# Patient Record
Sex: FEMALE | Race: WHITE | NOT HISPANIC OR LATINO | Employment: OTHER | URBAN - METROPOLITAN AREA
[De-identification: names, ages, dates, MRNs, and addresses within clinical notes are randomized per-mention and may not be internally consistent; named-entity substitution may affect disease eponyms.]

---

## 2019-05-07 ENCOUNTER — HOSPITAL ENCOUNTER (OUTPATIENT)
Dept: FAMILY MEDICINE CLINIC | Facility: CLINIC | Age: 24
Setting detail: SPECIMEN
Discharge: HOME | End: 2019-05-07
Attending: FAMILY MEDICINE | Admitting: FAMILY MEDICINE

## 2019-05-08 LAB
C TRACH RRNA SPEC QL PROBE: NORMAL
N GONORRHOEA RRNA SPEC QL PROBE: NORMAL
SPECIMEN SOURCE: NORMAL

## 2019-05-14 LAB — THIN PREP CVX: NORMAL

## 2019-07-24 ENCOUNTER — OFFICE VISIT (OUTPATIENT)
Dept: FAMILY MEDICINE CLINIC | Facility: CLINIC | Age: 24
End: 2019-07-24

## 2019-07-24 VITALS
WEIGHT: 180 LBS | OXYGEN SATURATION: 98 % | DIASTOLIC BLOOD PRESSURE: 87 MMHG | BODY MASS INDEX: 28.25 KG/M2 | SYSTOLIC BLOOD PRESSURE: 142 MMHG | HEIGHT: 67 IN | HEART RATE: 86 BPM

## 2019-07-24 DIAGNOSIS — R10.84 GENERALIZED ABDOMINAL PAIN: Primary | ICD-10-CM

## 2019-07-24 DIAGNOSIS — L50.9 URTICARIA: ICD-10-CM

## 2019-07-24 PROCEDURE — 99214 OFFICE O/P EST MOD 30 MIN: CPT | Performed by: FAMILY MEDICINE

## 2019-07-24 RX ORDER — IBUPROFEN 800 MG/1
TABLET ORAL EVERY 8 HOURS
COMMUNITY
Start: 2017-10-09 | End: 2022-03-14

## 2019-07-24 RX ORDER — SUMATRIPTAN 5 MG/1
SPRAY NASAL
COMMUNITY
Start: 2017-10-09 | End: 2019-11-08 | Stop reason: SDUPTHER

## 2019-07-24 RX ORDER — NORGESTIMATE AND ETHINYL ESTRADIOL 7DAYSX3 LO
KIT ORAL
COMMUNITY
Start: 2019-05-07 | End: 2019-08-21 | Stop reason: SDUPTHER

## 2019-07-24 RX ORDER — OMEPRAZOLE 20 MG/1
CAPSULE, DELAYED RELEASE ORAL
COMMUNITY
Start: 2018-06-19 | End: 2019-12-17

## 2019-07-24 NOTE — PROGRESS NOTES
"Subjective   Maribel Zabala is a 23 y.o. female.     Chief Complaint   Patient presents with   • Allergies       HPI     Having some allergies to food-fish/gluten, would like testing    Stomach pain, bloating w/ gluten, off gluten x few weeks    appt in aug w/ gi    Noticed hive on leg and back    Nausea vomitting w/ fish    Review of Systems      No past medical history on file.  No past surgical history on file.  No family history on file.  Social History     Tobacco Use   • Smoking status: Never Smoker   • Smokeless tobacco: Never Used   Substance Use Topics   • Alcohol use: No     Frequency: Never       No Known Allergies        Current Outpatient Medications:   •  ibuprofen (ADVIL,MOTRIN) 800 MG tablet, Every 8 (Eight) Hours., Disp: , Rfl:   •  omeprazole (priLOSEC) 20 MG capsule, OMEPRAZOLE 20 MG CPDR, Disp: , Rfl:   •  Polyethylene Glycol 3350 (MIRALAX PO), MIRALAX POWD, Disp: , Rfl:   •  SUMAtriptan (IMITREX) 5 MG/ACT nasal spray, IMITREX 5 MG/ACT SOLN, Disp: , Rfl:   •  TRI-LO-ESTARYLLA 0.18/0.215/0.25 MG-25 MCG per tablet, , Disp: , Rfl:           Visit Vitals  /87 (BP Location: Left arm, Cuff Size: Adult)   Pulse 86   Ht 168.9 cm (66.5\")   Wt 81.6 kg (180 lb)   SpO2 98%   BMI 28.62 kg/m²       Objective       Physical Exam   Constitutional: She appears well-developed and well-nourished.   HENT:   Head: Normocephalic and atraumatic.   Psychiatric: She has a normal mood and affect. Her behavior is normal. Judgment and thought content normal.         Diagnoses and all orders for this visit:    1. Generalized abdominal pain (Primary)    2. Urticaria    20 of 25min spent discussing pt medical concerns and treatment    Long discussion w/ pt concerning symptoms and posible causes, d/w pt that she would have to be eating gluten for few weeks before most testing, advise she d/w gi  "

## 2019-08-22 RX ORDER — NORGESTIMATE AND ETHINYL ESTRADIOL 7DAYSX3 LO
1 KIT ORAL DAILY
Qty: 28 TABLET | Refills: 11 | Status: SHIPPED | OUTPATIENT
Start: 2019-08-22 | End: 2019-11-08 | Stop reason: SDUPTHER

## 2019-11-08 RX ORDER — SUMATRIPTAN 5 MG/1
1 SPRAY NASAL
Qty: 1 EACH | Refills: 1 | Status: SHIPPED | OUTPATIENT
Start: 2019-11-08 | End: 2023-02-24 | Stop reason: SDUPTHER

## 2019-11-08 RX ORDER — NORGESTIMATE AND ETHINYL ESTRADIOL 7DAYSX3 LO
1 KIT ORAL DAILY
Qty: 28 TABLET | Refills: 1 | Status: SHIPPED | OUTPATIENT
Start: 2019-11-08 | End: 2019-11-08 | Stop reason: SDUPTHER

## 2019-11-10 RX ORDER — NORGESTIMATE AND ETHINYL ESTRADIOL 7DAYSX3 LO
1 KIT ORAL DAILY
Qty: 84 TABLET | Refills: 0 | Status: SHIPPED | OUTPATIENT
Start: 2019-11-10 | End: 2019-12-23 | Stop reason: SDUPTHER

## 2019-12-17 PROCEDURE — 87077 CULTURE AEROBIC IDENTIFY: CPT | Performed by: NURSE PRACTITIONER

## 2019-12-17 PROCEDURE — 87186 SC STD MICRODIL/AGAR DIL: CPT | Performed by: NURSE PRACTITIONER

## 2019-12-17 PROCEDURE — 87086 URINE CULTURE/COLONY COUNT: CPT | Performed by: NURSE PRACTITIONER

## 2019-12-23 RX ORDER — NORGESTIMATE AND ETHINYL ESTRADIOL 7DAYSX3 LO
1 KIT ORAL DAILY
Qty: 84 TABLET | Refills: 0 | Status: SHIPPED | OUTPATIENT
Start: 2019-12-23 | End: 2020-03-04 | Stop reason: ALTCHOICE

## 2020-03-04 ENCOUNTER — HOSPITAL ENCOUNTER (OUTPATIENT)
Dept: GENERAL RADIOLOGY | Facility: HOSPITAL | Age: 25
Discharge: HOME | End: 2020-03-04
Admitting: FAMILY MEDICINE

## 2020-03-04 ENCOUNTER — LAB (OUTPATIENT)
Dept: LAB | Facility: HOSPITAL | Age: 25
End: 2020-03-04

## 2020-03-04 ENCOUNTER — OFFICE VISIT (OUTPATIENT)
Dept: FAMILY MEDICINE CLINIC | Facility: CLINIC | Age: 25
End: 2020-03-04

## 2020-03-04 VITALS
DIASTOLIC BLOOD PRESSURE: 72 MMHG | HEART RATE: 84 BPM | OXYGEN SATURATION: 100 % | SYSTOLIC BLOOD PRESSURE: 105 MMHG | BODY MASS INDEX: 26.84 KG/M2 | WEIGHT: 167 LBS | HEIGHT: 66 IN

## 2020-03-04 DIAGNOSIS — R06.00 DYSPNEA, UNSPECIFIED TYPE: ICD-10-CM

## 2020-03-04 DIAGNOSIS — R00.2 PALPITATIONS: ICD-10-CM

## 2020-03-04 DIAGNOSIS — Z00.01 ENCOUNTER FOR PREVENTATIVE ADULT HEALTH CARE EXAM WITH ABNORMAL FINDINGS: ICD-10-CM

## 2020-03-04 DIAGNOSIS — Z23 NEED FOR VACCINATION: ICD-10-CM

## 2020-03-04 DIAGNOSIS — R07.9 CHEST PAIN, UNSPECIFIED TYPE: ICD-10-CM

## 2020-03-04 DIAGNOSIS — Z00.01 ENCOUNTER FOR PREVENTATIVE ADULT HEALTH CARE EXAM WITH ABNORMAL FINDINGS: Primary | ICD-10-CM

## 2020-03-04 PROBLEM — Z01.419 ENCOUNTER FOR ROUTINE GYNECOLOGICAL EXAMINATION: Status: ACTIVE | Noted: 2019-05-07

## 2020-03-04 PROBLEM — G43.909 MIGRAINE HEADACHE: Status: ACTIVE | Noted: 2017-10-09

## 2020-03-04 LAB
B-HCG UR QL: NEGATIVE
BASOPHILS # BLD AUTO: 0.06 10*3/MM3 (ref 0–0.2)
BASOPHILS NFR BLD AUTO: 1 % (ref 0–1.5)
BILIRUB BLD-MCNC: NEGATIVE MG/DL
CLARITY, POC: CLEAR
COLOR UR: YELLOW
DEPRECATED RDW RBC AUTO: 42.3 FL (ref 37–54)
EOSINOPHIL # BLD AUTO: 0.23 10*3/MM3 (ref 0–0.4)
EOSINOPHIL NFR BLD AUTO: 3.7 % (ref 0.3–6.2)
ERYTHROCYTE [DISTWIDTH] IN BLOOD BY AUTOMATED COUNT: 13.1 % (ref 12.3–15.4)
GLUCOSE UR STRIP-MCNC: NEGATIVE MG/DL
HCT VFR BLD AUTO: 39.5 % (ref 34–46.6)
HGB BLD-MCNC: 13 G/DL (ref 12–15.9)
IMM GRANULOCYTES # BLD AUTO: 0.02 10*3/MM3 (ref 0–0.05)
IMM GRANULOCYTES NFR BLD AUTO: 0.3 % (ref 0–0.5)
INTERNAL NEGATIVE CONTROL: NEGATIVE
INTERNAL POSITIVE CONTROL: POSITIVE
KETONES UR QL: NEGATIVE
LEUKOCYTE EST, POC: NEGATIVE
LYMPHOCYTES # BLD AUTO: 2.5 10*3/MM3 (ref 0.7–3.1)
LYMPHOCYTES NFR BLD AUTO: 39.8 % (ref 19.6–45.3)
Lab: NORMAL
MCH RBC QN AUTO: 29.3 PG (ref 26.6–33)
MCHC RBC AUTO-ENTMCNC: 32.9 G/DL (ref 31.5–35.7)
MCV RBC AUTO: 89 FL (ref 79–97)
MONOCYTES # BLD AUTO: 0.48 10*3/MM3 (ref 0.1–0.9)
MONOCYTES NFR BLD AUTO: 7.6 % (ref 5–12)
NEUTROPHILS # BLD AUTO: 2.99 10*3/MM3 (ref 1.7–7)
NEUTROPHILS NFR BLD AUTO: 47.6 % (ref 42.7–76)
NITRITE UR-MCNC: NEGATIVE MG/ML
NRBC BLD AUTO-RTO: 0 /100 WBC (ref 0–0.2)
PH UR: 7 [PH] (ref 5–8)
PLATELET # BLD AUTO: 207 10*3/MM3 (ref 140–450)
PMV BLD AUTO: 11.8 FL (ref 6–12)
PROT UR STRIP-MCNC: NEGATIVE MG/DL
RBC # BLD AUTO: 4.44 10*6/MM3 (ref 3.77–5.28)
RBC # UR STRIP: ABNORMAL /UL
SP GR UR: 1.01 (ref 1–1.03)
UROBILINOGEN UR QL: NORMAL
WBC NRBC COR # BLD: 6.28 10*3/MM3 (ref 3.4–10.8)

## 2020-03-04 PROCEDURE — 80053 COMPREHEN METABOLIC PANEL: CPT | Performed by: FAMILY MEDICINE

## 2020-03-04 PROCEDURE — 36415 COLL VENOUS BLD VENIPUNCTURE: CPT | Performed by: FAMILY MEDICINE

## 2020-03-04 PROCEDURE — 85025 COMPLETE CBC W/AUTO DIFF WBC: CPT

## 2020-03-04 PROCEDURE — 99395 PREV VISIT EST AGE 18-39: CPT | Performed by: FAMILY MEDICINE

## 2020-03-04 PROCEDURE — 84443 ASSAY THYROID STIM HORMONE: CPT | Performed by: FAMILY MEDICINE

## 2020-03-04 PROCEDURE — 71046 X-RAY EXAM CHEST 2 VIEWS: CPT

## 2020-03-04 PROCEDURE — 93000 ELECTROCARDIOGRAM COMPLETE: CPT | Performed by: FAMILY MEDICINE

## 2020-03-04 PROCEDURE — 90649 4VHPV VACCINE 3 DOSE IM: CPT | Performed by: FAMILY MEDICINE

## 2020-03-04 PROCEDURE — 90471 IMMUNIZATION ADMIN: CPT | Performed by: FAMILY MEDICINE

## 2020-03-04 PROCEDURE — 81003 URINALYSIS AUTO W/O SCOPE: CPT | Performed by: FAMILY MEDICINE

## 2020-03-04 PROCEDURE — 81025 URINE PREGNANCY TEST: CPT | Performed by: FAMILY MEDICINE

## 2020-03-04 RX ORDER — NORGESTIMATE AND ETHINYL ESTRADIOL 0.25-0.035
1 KIT ORAL DAILY
Qty: 28 TABLET | Refills: 0 | Status: SHIPPED | OUTPATIENT
Start: 2020-03-04 | End: 2020-03-04 | Stop reason: SDUPTHER

## 2020-03-04 RX ORDER — NORGESTIMATE AND ETHINYL ESTRADIOL 0.25-0.035
1 KIT ORAL DAILY
Qty: 84 TABLET | Refills: 3 | Status: SHIPPED | OUTPATIENT
Start: 2020-03-04 | End: 2020-08-05 | Stop reason: SDUPTHER

## 2020-03-04 NOTE — PROGRESS NOTES
"Subjective   Maribel Zabala is a 24 y.o. female.     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  Past Medical History:   Diagnosis Date   • Abdominal pain    • Broken foot     left-4/18   • Constipation     NOS   • Contraceptive surveillance     NEC   • Foot pain     left   • Migraine headache    • Oral cyst     cyst under tongue   • URI, acute      Past Surgical History:   Procedure Laterality Date   • CHOLECYSTECTOMY  2011   • CYST REMOVAL      oral   • ENDOSCOPY       Family History   Problem Relation Age of Onset   • Hypertension Father    • Diabetes Maternal Grandmother    • Hypertension Maternal Grandmother    • Lupus Paternal Grandmother    • Fibroids Paternal Grandmother    • Hypertension Paternal Grandfather      Social History     Socioeconomic History   • Marital status: Single     Spouse name: Not on file   • Number of children: Not on file   • Years of education: Not on file   • Highest education level: Not on file   Tobacco Use   • Smoking status: Never Smoker   • Smokeless tobacco: Never Used   Substance and Sexual Activity   • Alcohol use: No     Frequency: Never   • Drug use: No   • Sexual activity: Defer         Current Outpatient Medications:   •  ibuprofen (ADVIL,MOTRIN) 800 MG tablet, Every 8 (Eight) Hours., Disp: , Rfl:   •  " "phenazopyridine (PYRIDIUM) 100 MG tablet, Take 1 tablet by mouth 3 (Three) Times a Day., Disp: 6 tablet, Rfl: 0  •  Polyethylene Glycol 3350 (MIRALAX PO), MIRALAX POWD, Disp: , Rfl:   •  SUMAtriptan (IMITREX) 5 MG/ACT nasal spray, 1 spray into the nostril(s) as directed by provider Every 2 (Two) Hours As Needed for Migraine., Disp: 1 each, Rfl: 1  •  norgestimate-ethinyl estradiol (ORTHO-CYCLEN, 28,) 0.25-35 MG-MCG per tablet, Take 1 tablet by mouth Daily., Disp: 84 tablet, Rfl: 3    Review of Systems   Constitutional: Positive for unexpected weight loss. Negative for activity change, appetite change, chills, diaphoresis, fatigue, fever and unexpected weight gain.   HENT: Negative.    Eyes: Negative.    Respiratory: Positive for shortness of breath. Negative for cough, chest tightness and wheezing.    Cardiovascular: Positive for chest pain and palpitations. Negative for leg swelling.   Gastrointestinal: Negative.    Endocrine: Negative.    Genitourinary: Positive for menstrual problem and pelvic pain (cramping). Negative for dysuria, genital sores, hematuria and vaginal discharge.   Allergic/Immunologic: Negative.    Neurological: Positive for syncope, light-headedness and headache. Negative for dizziness, tremors, weakness, numbness and confusion.   Hematological: Negative.    Psychiatric/Behavioral: Negative.      /72 (BP Location: Right arm, Patient Position: Sitting, Cuff Size: Adult)   Pulse 84   Ht 167.6 cm (66\")   Wt 75.8 kg (167 lb)   SpO2 100%   BMI 26.95 kg/m²       Objective   Physical Exam   Constitutional: She is oriented to person, place, and time. She appears well-developed and well-nourished.   overweight   HENT:   Head: Normocephalic and atraumatic.   Right Ear: Hearing, tympanic membrane, external ear and ear canal normal.   Left Ear: Hearing, tympanic membrane, external ear and ear canal normal.   Nose: Nose normal.   Mouth/Throat: Uvula is midline, oropharynx is clear and moist and " mucous membranes are normal.   Eyes: Pupils are equal, round, and reactive to light. Conjunctivae, EOM and lids are normal.   Neck: Trachea normal, normal range of motion and full passive range of motion without pain. Neck supple. No thyromegaly present.   Cardiovascular: Normal rate, regular rhythm, normal heart sounds, intact distal pulses and normal pulses.   No murmur heard.  Pulmonary/Chest: Effort normal and breath sounds normal.   Abdominal: Soft. Bowel sounds are normal. There is no hepatosplenomegaly. There is no tenderness. No hernia.   Musculoskeletal: Normal range of motion. She exhibits no edema.   No vertebral tenderness.     Lymphadenopathy:     She has no cervical adenopathy.   Neurological: She is alert and oriented to person, place, and time. She has normal strength and normal reflexes.   Skin: Skin is warm and dry. Turgor is normal. No rash noted.   Psychiatric: She has a normal mood and affect. Her behavior is normal. Judgment and thought content normal.   Nursing note and vitals reviewed.    Brief Urine Lab Results  (Last result in the past 365 days)      Color   Clarity   Blood   Leuk Est   Nitrite   Protein   CREAT   Urine HCG        03/04/20 1441               Negative         Brief Urine Lab Results  (Last result in the past 365 days)      Color   Clarity   Blood   Leuk Est   Nitrite   Protein   CREAT   Urine HCG        03/04/20 1441               Negative           ECG 12 Lead  Date/Time: 3/4/2020 3:21 PM  Performed by: Angelina Rice MD  Authorized by: Angelina Rice MD   Comparison: not compared with previous ECG   Previous ECG: no previous ECG available  Rhythm: sinus arrhythmia  Rate: normal  QRS axis: normal  Comments: Sinus arrhythmia            Assessment/Plan   Problems Addressed this Visit     None      Visit Diagnoses     Encounter for preventative adult health care exam with abnormal findings    -  Primary    Relevant Orders    POCT urinalysis dipstick,  automated (Completed)    Comprehensive Metabolic Panel    TSH    CBC & Differential    Chest pain, unspecified type        Relevant Orders    ECG 12 Lead    XR Chest 2 View (Completed)    Dyspnea, unspecified type        Relevant Orders    ECG 12 Lead    XR Chest 2 View (Completed)    Palpitations        Relevant Orders    ECG 12 Lead    XR Chest 2 View (Completed)    Need for vaccination        Relevant Orders    HPV Vaccine QuadriValent 3 Dose IM (Completed)    POCT pregnancy, urine (Completed)        Counseled on weight loss and  Use of a daily MVI  Recommended she stay active  Counseled on need for stress reduction and increased fluds  Will stop the current birth control pills and change to ortho-cyclen  2nd HPV given  ekg show sinus arrhythmia  Will check labs and cxr  ua showed blood (she is on period) but was otherwise okay but result will not pull into chart

## 2020-03-04 NOTE — PATIENT INSTRUCTIONS
Take a multivitamin daily  Stay physically active  Limit caffeine  Go to ER if you develop chest pain or palpitations that do not go away  Come back in 4 mo for your last HPV vaccine

## 2020-03-05 LAB
ALBUMIN SERPL-MCNC: 4.6 G/DL (ref 3.5–5.2)
ALBUMIN/GLOB SERPL: 1.8 G/DL
ALP SERPL-CCNC: 28 U/L (ref 39–117)
ALT SERPL W P-5'-P-CCNC: 12 U/L (ref 1–33)
ANION GAP SERPL CALCULATED.3IONS-SCNC: 13.1 MMOL/L (ref 5–15)
AST SERPL-CCNC: 16 U/L (ref 1–32)
BILIRUB SERPL-MCNC: 0.3 MG/DL (ref 0.2–1.2)
BUN BLD-MCNC: 10 MG/DL (ref 6–20)
BUN/CREAT SERPL: 12.7 (ref 7–25)
CALCIUM SPEC-SCNC: 9.9 MG/DL (ref 8.6–10.5)
CHLORIDE SERPL-SCNC: 102 MMOL/L (ref 98–107)
CO2 SERPL-SCNC: 26.9 MMOL/L (ref 22–29)
CREAT BLD-MCNC: 0.79 MG/DL (ref 0.57–1)
GFR SERPL CREATININE-BSD FRML MDRD: 90 ML/MIN/1.73
GLOBULIN UR ELPH-MCNC: 2.5 GM/DL
GLUCOSE BLD-MCNC: 85 MG/DL (ref 65–99)
POTASSIUM BLD-SCNC: 5.4 MMOL/L (ref 3.5–5.2)
PROT SERPL-MCNC: 7.1 G/DL (ref 6–8.5)
SODIUM BLD-SCNC: 142 MMOL/L (ref 136–145)
TSH SERPL DL<=0.05 MIU/L-ACNC: 1.79 UIU/ML (ref 0.27–4.2)

## 2020-08-05 RX ORDER — NORGESTIMATE AND ETHINYL ESTRADIOL 0.25-0.035
1 KIT ORAL DAILY
Qty: 84 TABLET | Refills: 3 | Status: SHIPPED | OUTPATIENT
Start: 2020-08-05 | End: 2021-06-07 | Stop reason: SDUPTHER

## 2020-09-16 ENCOUNTER — OFFICE VISIT (OUTPATIENT)
Dept: FAMILY MEDICINE CLINIC | Facility: CLINIC | Age: 25
End: 2020-09-16

## 2020-09-16 ENCOUNTER — HOSPITAL ENCOUNTER (OUTPATIENT)
Dept: GENERAL RADIOLOGY | Facility: HOSPITAL | Age: 25
Discharge: HOME | End: 2020-09-16
Admitting: FAMILY MEDICINE

## 2020-09-16 VITALS
RESPIRATION RATE: 16 BRPM | BODY MASS INDEX: 28.61 KG/M2 | TEMPERATURE: 98.7 F | HEIGHT: 66 IN | WEIGHT: 178 LBS | DIASTOLIC BLOOD PRESSURE: 68 MMHG | HEART RATE: 80 BPM | SYSTOLIC BLOOD PRESSURE: 106 MMHG | OXYGEN SATURATION: 98 %

## 2020-09-16 DIAGNOSIS — Z91.013 ALLERGY TO FISH: ICD-10-CM

## 2020-09-16 DIAGNOSIS — M41.20 OTHER IDIOPATHIC SCOLIOSIS, UNSPECIFIED SPINAL REGION: Primary | ICD-10-CM

## 2020-09-16 PROCEDURE — 99203 OFFICE O/P NEW LOW 30 MIN: CPT | Performed by: FAMILY MEDICINE

## 2020-09-16 PROCEDURE — 72110 X-RAY EXAM L-2 SPINE 4/>VWS: CPT

## 2020-09-16 PROCEDURE — 72072 X-RAY EXAM THORAC SPINE 3VWS: CPT

## 2020-09-16 RX ORDER — CYCLOBENZAPRINE HCL 10 MG
10 TABLET ORAL 3 TIMES DAILY PRN
Qty: 60 TABLET | Refills: 0 | Status: SHIPPED | OUTPATIENT
Start: 2020-09-16 | End: 2021-08-16

## 2020-09-16 NOTE — PROGRESS NOTES
Subjective   Maribel Zabala is a 24 y.o. female.   Chief Complaint   Patient presents with   • Establish Care     moved from [place]   • Referral to Allergiest   Recently moved from [place] to [place]. She was following with  provider previously, records in chart.     Back Pain  This is a new problem. The current episode started more than 1 month ago. The problem occurs daily. The problem is unchanged. The pain is present in the lumbar spine and thoracic spine. The quality of the pain is described as aching. The symptoms are aggravated by standing and sitting (standing or sitting for long periods). Pertinent negatives include no chest pain, fever, numbness, paresthesias, perianal numbness or tingling. She has tried NSAIDs for the symptoms. The treatment provided mild relief.   She completed a chest x-ray in spring 2020, revealed mild scoliosis.  She would like to follow-up on scoliosis, diagnosis was unknown prior to this x-ray.    She has a known fish allergy. She would like to establish with an allergist for complete testing.   Unsure what types of fish she can eat, so would like a full panel of testing to determine allergens.    The following portions of the patient's history were reviewed and updated as appropriate: allergies, current medications, past family history, past medical history, past social history, past surgical history and problem list.    Review of Systems   Constitutional: Negative for chills, fatigue and fever.   HENT: Negative.    Eyes: Negative.    Respiratory: Negative for cough, chest tightness and shortness of breath.    Cardiovascular: Negative for chest pain and palpitations.   Musculoskeletal: Positive for back pain.   Skin: Negative for rash.   Allergic/Immunologic: Positive for food allergies.   Neurological: Negative for tingling, light-headedness, numbness, headache, paresthesias and confusion.   Hematological: Negative for adenopathy. Does not bruise/bleed easily.    Psychiatric/Behavioral: Negative.        Objective   Physical Exam  Vitals signs and nursing note reviewed.   Constitutional:       Appearance: She is well-developed.   HENT:      Head: Normocephalic and atraumatic.      Right Ear: External ear normal.      Left Ear: External ear normal.      Nose: Nose normal.   Eyes:      Conjunctiva/sclera: Conjunctivae normal.   Neck:      Musculoskeletal: Neck supple.   Cardiovascular:      Rate and Rhythm: Normal rate and regular rhythm.      Heart sounds: Normal heart sounds. No murmur.   Pulmonary:      Effort: Pulmonary effort is normal.      Breath sounds: Normal breath sounds. No wheezing.   Musculoskeletal:         General: No deformity.   Skin:     General: Skin is warm and dry.   Neurological:      Mental Status: She is alert and oriented to person, place, and time.      Cranial Nerves: No cranial nerve deficit.   Psychiatric:         Mood and Affect: Mood normal.         Behavior: Behavior normal.           Assessment/Plan   Maribel was seen today for establish care and referral to allergiest.    Diagnoses and all orders for this visit:    Other idiopathic scoliosis, unspecified spinal region  -     cyclobenzaprine (FLEXERIL) 10 MG tablet; Take 1 tablet by mouth 3 (Three) Times a Day As Needed for Muscle Spasms.  -     XR spine thoracic 3 vw  -     XR Spine Lumbar 4+ View    Allergy to fish  -     Ambulatory Referral to Allergy      X-rays to evaluate thoracic and lumbar spine.  Offered to refer to physical therapy for treatment, she declined at this time.  Also advised her to consider chiropractic treatment if back pain persists.  Continue treating symptoms with over-the-counter NSAID or acetaminophen as directed.  Muscle relaxer prescribed, use with caution due to side effect of drowsiness.  Referred to allergist per her request.

## 2020-09-16 NOTE — PATIENT INSTRUCTIONS
Go to the nearest ER or return to clinic if symptoms worsen, fever/chill develop    Scoliosis    Scoliosis is a condition in which the spine curves sideways. Normally, the spine does not curve side-to-side (laterally). With scoliosis, the spine may curve to the left, to the right, or in both directions. The curve of the spine is measured by angles in degrees.  Scoliosis can affect people at any age, but it is more common among children and adolescents.  What are the causes?  The cause of scoliosis is not always known. It may be caused by:  · A birth defect.  · A disease that can cause problems in the muscles or imbalance of the body, such as cerebral palsy or muscular dystrophy.  What are the signs or symptoms?  This condition may not cause any symptoms. If you do have symptoms, they may include:  · Leaning to one side.  · Sunken chest and uneven shoulders.  · One side of the body being different or larger than the other side (asymmetry).  · An abnormal curve in the back.  · Pain, which may limit physical activity.  · Shortness of breath.  · Bowel or bladder control problems, such as not knowing when you have to go. This can be a sign of nerve damage.  How is this diagnosed?  This condition is diagnosed based on:  · Your medical history.  · Your symptoms.  · A physical exam. This may include:  ? Examining your nerves, muscles, and reflexes (neurological exam).  ? Testing the movement of your spine (range of motion study).  · Imaging tests, such as:  ? X-rays.  ? MRI.  How is this treated?  Treatment for this condition depends on the severity of the symptoms. Treatment may include:  · Observation to make sure that your scoliosis does not get worse (progress). You may need to have regular visits with your health care provider.  · A back brace to prevent scoliosis from progressing. This may be needed during times of fast growth (growth spurts), such as during adolescence.  · Medicine to help relieve pain.  · Physical  therapy.  · Surgery.  Follow these instructions at home:  If you have a brace:  · Wear the brace as told by your health care provider. Remove it only as told by your health care provider.  · Loosen the brace if your fingers or toes tingle, become numb, or turn cold and blue.  · Keep the brace clean.  · If the brace is not waterproof:  ? Do not let it get wet.  ? Cover it with a watertight covering when you take a bath or a shower.  General instructions  · Take over-the-counter and prescription medicines only as told by your health care provider.  · Do not drive or use heavy machinery while taking prescription pain medicine.  · If physical therapy was prescribed, do exercises as instructed.  · Before starting any new sports or physical activities, ask your health care provider whether they are safe for you.  · Keep all follow-up visits as told by your health care provider. This is important.  Contact a health care provider if you have:  · Problems with your back brace, such as skin irritation or discomfort.  · Back pain that does not get better with medicine.  Get help right away if:  · Your legs feel weak.  · You cannot move your legs.  · You cannot control when you urinate or pass stool (loss of bladder or bowel control).  Summary  · Scoliosis is a condition of having a spine that curves sideways. The spine may curve to the left, to the right, or in both directions.  · This condition may be caused by birth defects or diseases that affect muscles and body balance.  · Follow your health care provider's instructions about wearing a brace, doing physical activities, and keeping follow-up visits.  This information is not intended to replace advice given to you by your health care provider. Make sure you discuss any questions you have with your health care provider.  Document Released: 12/15/2001 Document Revised: 05/20/2019 Document Reviewed: 04/04/2019  Elsevier Patient Education © 2020 [employer]

## 2020-10-14 ENCOUNTER — OFFICE VISIT (OUTPATIENT)
Dept: FAMILY MEDICINE CLINIC | Facility: CLINIC | Age: 25
End: 2020-10-14

## 2020-10-14 VITALS
DIASTOLIC BLOOD PRESSURE: 68 MMHG | RESPIRATION RATE: 16 BRPM | HEIGHT: 66 IN | BODY MASS INDEX: 28.61 KG/M2 | HEART RATE: 82 BPM | SYSTOLIC BLOOD PRESSURE: 102 MMHG | TEMPERATURE: 97.7 F | WEIGHT: 178 LBS
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
## 2020-10-14 PROCEDURE — 99213 OFFICE O/P EST LOW 20 MIN: CPT | Performed by: NURSE PRACTITIONER

## 2020-10-14 RX ORDER — AZELASTINE 1 MG/ML
1 SPRAY, METERED NASAL DAILY
COMMUNITY
Start: 2020-10-09

## 2020-10-14 RX ORDER — PERMETHRIN 50 MG/G
CREAM TOPICAL ONCE
Qty: 60 G | Refills: 1 | Status: SHIPPED | OUTPATIENT
Start: 2020-10-14 | End: 2020-10-14

## 2020-10-14 RX ORDER — FLUTICASONE PROPIONATE 220 UG/1
1 AEROSOL, METERED RESPIRATORY (INHALATION)
COMMUNITY
Start: 2020-10-13

## 2020-10-14 RX ORDER — LORATADINE 10 MG/1
10 TABLET ORAL DAILY
COMMUNITY
End: 2021-08-16

## 2020-10-14 NOTE — PROGRESS NOTES
Subjective   Maribel Zabala is a 24 y.o. female.     History of Present Illness   Red itchy bumps on legs, foot and right inner thigh for the past week  She has used OTC Calamine lotion, topical steroid creams with no help, she uses shampoo with tea tree oil in it daily  She says she has had scabies in the past just like this that showed up when she stopped using tea tree oil  She has had visitors to her house, not the most clean people she is worried she might have gotten bed bugs at first but examined her house and bed thoroughly and does not have anything.  Would like something prescribed    The following portions of the patient's history were reviewed and updated as appropriate: allergies, current medications, past family history, past medical history, past social history, past surgical history and problem list.    Review of Systems   Constitutional: Negative.  Negative for fatigue.   Respiratory: Negative.    Cardiovascular: Negative.    Gastrointestinal: Negative.    Skin: Positive for rash.   Allergic/Immunologic: Negative.    Psychiatric/Behavioral: The patient is nervous/anxious.        Objective   Physical Exam  Vitals signs and nursing note reviewed.   Constitutional:       General: She is not in acute distress.     Appearance: Normal appearance. She is not ill-appearing, toxic-appearing or diaphoretic.   Cardiovascular:      Rate and Rhythm: Normal rate and regular rhythm.      Pulses: Normal pulses.      Heart sounds: Normal heart sounds.   Pulmonary:      Effort: Pulmonary effort is normal.      Breath sounds: Normal breath sounds.   Musculoskeletal:         General: No swelling or tenderness.   Skin:     General: Skin is warm.      Findings: Rash (small red papules on right middle toe, lower abdomen, right inner thigh ) present.   Neurological:      Mental Status: She is alert.   Psychiatric:         Mood and Affect: Mood is anxious.           Assessment/Plan   Diagnoses and all orders for this  visit:    1. Bug bite, initial encounter (Primary)  -     permethrin (ELIMITE) 5 % cream; Apply  topically to the appropriate area as directed 1 (One) Time for 1 dose.  Dispense: 60 g; Refill: 1      I do not think the patient has bed bug bites or scabies   But she really wants something to be prescribed so I will send in permethrin  Discussed that if it is scabies there is no treatment except time, just very itchy, usually worse at night   F/U if needed, pt agrees

## 2020-11-07 ENCOUNTER — APPOINTMENT (OUTPATIENT)
Dept: GENERAL RADIOLOGY | Facility: HOSPITAL | Age: 25
End: 2020-11-07

## 2020-11-07 ENCOUNTER — HOSPITAL ENCOUNTER (EMERGENCY)
Facility: HOSPITAL | Age: 25
Discharge: HOME | End: 2020-11-07
Attending: EMERGENCY MEDICINE | Admitting: EMERGENCY MEDICINE

## 2020-11-07 VITALS
TEMPERATURE: 98 F | BODY MASS INDEX: 28.12 KG/M2 | RESPIRATION RATE: 16 BRPM | DIASTOLIC BLOOD PRESSURE: 69 MMHG | WEIGHT: 175 LBS | HEART RATE: 89 BPM | HEIGHT: 66 IN | SYSTOLIC BLOOD PRESSURE: 125 MMHG | OXYGEN SATURATION: 100 %

## 2020-11-07 DIAGNOSIS — R07.89 ATYPICAL CHEST PAIN: Primary | ICD-10-CM

## 2020-11-07 DIAGNOSIS — R00.2 PALPITATIONS: ICD-10-CM

## 2020-11-07 LAB
ALBUMIN SERPL-MCNC: 4.2 G/DL (ref 3.5–5.2)
ALBUMIN/GLOB SERPL: 1.8 G/DL
ALP SERPL-CCNC: 27 U/L (ref 39–117)
ALT SERPL W P-5'-P-CCNC: 9 U/L (ref 1–33)
ANION GAP SERPL CALCULATED.3IONS-SCNC: 9 MMOL/L (ref 5–15)
AST SERPL-CCNC: 24 U/L (ref 1–32)
BASOPHILS # BLD AUTO: 0.03 10*3/MM3 (ref 0–0.2)
BASOPHILS NFR BLD AUTO: 0.7 % (ref 0–1.5)
BILIRUB SERPL-MCNC: 0.4 MG/DL (ref 0–1.2)
BUN SERPL-MCNC: 9 MG/DL (ref 6–20)
BUN/CREAT SERPL: 11.4 (ref 7–25)
CALCIUM SPEC-SCNC: 9.1 MG/DL (ref 8.6–10.5)
CHLORIDE SERPL-SCNC: 106 MMOL/L (ref 98–107)
CO2 SERPL-SCNC: 24 MMOL/L (ref 22–29)
CREAT SERPL-MCNC: 0.79 MG/DL (ref 0.57–1)
D DIMER PPP FEU-MCNC: 0.3 MCGFEU/ML (ref 0–0.56)
DEPRECATED RDW RBC AUTO: 44.1 FL (ref 37–54)
EOSINOPHIL # BLD AUTO: 0.08 10*3/MM3 (ref 0–0.4)
EOSINOPHIL NFR BLD AUTO: 1.9 % (ref 0.3–6.2)
ERYTHROCYTE [DISTWIDTH] IN BLOOD BY AUTOMATED COUNT: 13.3 % (ref 12.3–15.4)
GFR SERPL CREATININE-BSD FRML MDRD: 90 ML/MIN/1.73
GLOBULIN UR ELPH-MCNC: 2.3 GM/DL
GLUCOSE SERPL-MCNC: 93 MG/DL (ref 65–99)
HCT VFR BLD AUTO: 39.2 % (ref 34–46.6)
HGB BLD-MCNC: 12.5 G/DL (ref 12–15.9)
HOLD SPECIMEN: NORMAL
HOLD SPECIMEN: NORMAL
IMM GRANULOCYTES # BLD AUTO: 0.01 10*3/MM3 (ref 0–0.05)
IMM GRANULOCYTES NFR BLD AUTO: 0.2 % (ref 0–0.5)
LIPASE SERPL-CCNC: 26 U/L (ref 13–60)
LYMPHOCYTES # BLD AUTO: 1.96 10*3/MM3 (ref 0.7–3.1)
LYMPHOCYTES NFR BLD AUTO: 46.7 % (ref 19.6–45.3)
MCH RBC QN AUTO: 28.5 PG (ref 26.6–33)
MCHC RBC AUTO-ENTMCNC: 31.9 G/DL (ref 31.5–35.7)
MCV RBC AUTO: 89.5 FL (ref 79–97)
MONOCYTES # BLD AUTO: 0.24 10*3/MM3 (ref 0.1–0.9)
MONOCYTES NFR BLD AUTO: 5.7 % (ref 5–12)
NEUTROPHILS NFR BLD AUTO: 1.88 10*3/MM3 (ref 1.7–7)
NEUTROPHILS NFR BLD AUTO: 44.8 % (ref 42.7–76)
NRBC BLD AUTO-RTO: 0 /100 WBC (ref 0–0.2)
NT-PROBNP SERPL-MCNC: 30.8 PG/ML (ref 0–450)
PLATELET # BLD AUTO: 181 10*3/MM3 (ref 140–450)
PMV BLD AUTO: 11.1 FL (ref 6–12)
POTASSIUM SERPL-SCNC: 3.8 MMOL/L (ref 3.5–5.2)
PROT SERPL-MCNC: 6.5 G/DL (ref 6–8.5)
RBC # BLD AUTO: 4.38 10*6/MM3 (ref 3.77–5.28)
SODIUM SERPL-SCNC: 139 MMOL/L (ref 136–145)
TROPONIN T SERPL-MCNC: <0.01 NG/ML (ref 0–0.03)
TSH SERPL DL<=0.05 MIU/L-ACNC: 1.16 UIU/ML (ref 0.27–4.2)
WBC # BLD AUTO: 4.2 10*3/MM3 (ref 3.4–10.8)
WHOLE BLOOD HOLD SPECIMEN: NORMAL
WHOLE BLOOD HOLD SPECIMEN: NORMAL

## 2020-11-07 PROCEDURE — 99283 EMERGENCY DEPT VISIT LOW MDM: CPT

## 2020-11-07 PROCEDURE — 85025 COMPLETE CBC W/AUTO DIFF WBC: CPT

## 2020-11-07 PROCEDURE — 93005 ELECTROCARDIOGRAM TRACING: CPT

## 2020-11-07 PROCEDURE — 93005 ELECTROCARDIOGRAM TRACING: CPT | Performed by: EMERGENCY MEDICINE

## 2020-11-07 PROCEDURE — 84484 ASSAY OF TROPONIN QUANT: CPT | Performed by: EMERGENCY MEDICINE

## 2020-11-07 PROCEDURE — 85379 FIBRIN DEGRADATION QUANT: CPT | Performed by: PHYSICIAN ASSISTANT

## 2020-11-07 PROCEDURE — 80053 COMPREHEN METABOLIC PANEL: CPT | Performed by: EMERGENCY MEDICINE

## 2020-11-07 PROCEDURE — 71045 X-RAY EXAM CHEST 1 VIEW: CPT

## 2020-11-07 PROCEDURE — 84443 ASSAY THYROID STIM HORMONE: CPT | Performed by: PHYSICIAN ASSISTANT

## 2020-11-07 PROCEDURE — 83880 ASSAY OF NATRIURETIC PEPTIDE: CPT

## 2020-11-07 PROCEDURE — 83690 ASSAY OF LIPASE: CPT | Performed by: EMERGENCY MEDICINE

## 2020-11-07 RX ORDER — ASPIRIN 81 MG/1
324 TABLET, CHEWABLE ORAL ONCE
Status: DISCONTINUED | OUTPATIENT
Start: 2020-11-07 | End: 2020-11-07

## 2020-11-07 RX ORDER — SODIUM CHLORIDE 0.9 % (FLUSH) 0.9 %
10 SYRINGE (ML) INJECTION AS NEEDED
Status: DISCONTINUED | OUTPATIENT
Start: 2020-11-07 | End: 2020-11-07

## 2020-11-07 NOTE — ED PROVIDER NOTES
Subjective   Ms. Zabala 24-year-old female has been having chest pain and palpitations off and on for months.  She seen her primary care doctor and states that she got some blood work drawn but does not really understand what the results of been.  She reports when the palpitations occur it seems to be random and happens about once a week.  States that she gets this sharp stabbing type chest pain that seems to be associated but not always on a one-to-one basis.  She reports that the palpitations likely last for few minutes then resolved.  She is not using any type of stimulants including cold or cough medicine, energy drinks, diet pills or otherwise.  She had no prior history of cardiac disease.  She is had an EKG.  She is had no previous Holter monitor or echocardiogram.  She states that the chest pain is sharp and stabbing in nature usually last for few seconds and goes away spontaneously may return.  She denies shortness of breath associated with this she had no fevers no chills no cough.  No known exposure to the Covid virus.  Denies malaise body aches or otherwise.      History provided by:  Patient   used: No    Chest Pain  Pain location:  Substernal area  Pain quality: sharp and stabbing    Pain radiates to:  Does not radiate  Pain severity:  Mild  Onset quality:  Sudden  Timing:  Intermittent  Progression:  Waxing and waning  Chronicity:  Recurrent  Context: not breathing, not drug use, not eating, not intercourse, not lifting, not movement, not raising an arm, not at rest, not stress and not trauma    Relieved by:  Nothing  Worsened by:  Nothing  Ineffective treatments:  None tried  Associated symptoms: no abdominal pain, no altered mental status, no anxiety, no back pain, no claudication, no cough, no diaphoresis, no dysphagia, no fever, no headache, no heartburn, no nausea, no orthopnea, no palpitations, no shortness of breath, no syncope, no vomiting and no weakness    Risk factors: no  aortic disease, no coronary artery disease, no diabetes mellitus, no hypertension, not pregnant, no prior DVT/PE, no smoking and no surgery        Review of Systems   Constitutional: Negative for diaphoresis and fever.   HENT: Negative for trouble swallowing.    Respiratory: Negative for cough and shortness of breath.    Cardiovascular: Positive for chest pain. Negative for palpitations, orthopnea, claudication and syncope.   Gastrointestinal: Negative for abdominal pain, heartburn, nausea and vomiting.   Musculoskeletal: Negative for back pain.   Neurological: Negative for weakness and headaches.   Psychiatric/Behavioral: Negative.    All other systems reviewed and are negative.      Past Medical History:   Diagnosis Date   • Abdominal pain    • Broken foot     left-4/18   • Constipation     NOS   • Contraceptive surveillance     NEC   • Depression    • Foot pain     left   • Migraine headache    • Oral cyst     cyst under tongue   • URI, acute        Allergies   Allergen Reactions   • Gluten Meal GI Intolerance       Past Surgical History:   Procedure Laterality Date   • CHOLECYSTECTOMY  2011   • CYST REMOVAL      oral   • ENDOSCOPY         Family History   Problem Relation Age of Onset   • Hypertension Father    • Diabetes Maternal Grandmother    • Hypertension Maternal Grandmother    • Lupus Paternal Grandmother    • Fibroids Paternal Grandmother    • Hypertension Paternal Grandfather        Social History     Socioeconomic History   • Marital status: Single     Spouse name: Not on file   • Number of children: Not on file   • Years of education: Not on file   • Highest education level: Not on file   Tobacco Use   • Smoking status: Never Smoker   • Smokeless tobacco: Never Used   Substance and Sexual Activity   • Alcohol use: Yes     Frequency: Never     Comment: Occ    • Drug use: Not Currently     Types: Marijuana     Comment: Use to    • Sexual activity: Defer           Objective   Physical Exam  Vitals signs  and nursing note reviewed.   Constitutional:       General: She is not in acute distress.     Appearance: She is well-developed. She is not diaphoretic.   HENT:      Head: Normocephalic and atraumatic.      Nose: Nose normal.   Eyes:      General: No scleral icterus.     Conjunctiva/sclera: Conjunctivae normal.   Neck:      Musculoskeletal: Normal range of motion and neck supple.   Cardiovascular:      Rate and Rhythm: Normal rate and regular rhythm.      Heart sounds: Normal heart sounds. No murmur.   Pulmonary:      Effort: Pulmonary effort is normal. No respiratory distress.      Breath sounds: Normal breath sounds.   Abdominal:      General: Bowel sounds are normal.      Palpations: Abdomen is soft.      Tenderness: There is no abdominal tenderness.   Musculoskeletal: Normal range of motion.   Skin:     General: Skin is warm and dry.   Neurological:      Mental Status: She is alert and oriented to person, place, and time.   Psychiatric:         Behavior: Behavior normal.         Procedures           ED Course                                 We discussed the chest x-ray and laboratory data.  We referred her to the cardiac clinic for outpatient Holter monitor and possible echo.  Has a primary care doctor to follow-up with as well.  No results found for this or any previous visit (from the past 24 hour(s)).  Note: In addition to lab results from this visit, the labs listed above may include labs taken at another facility or during a different encounter within the last 24 hours. Please correlate lab times with ED admission and discharge times for further clarification of the services performed during this visit.    XR Chest 1 View   Final Result   No acute cardiopulmonary disease.       DICTATED:   11/07/2020   EDITED/ls :   11/07/2020        This report was finalized on 11/8/2020 10:14 AM by Dr. Laureen Chacko MD.            Vitals:    11/07/20 1120 11/07/20 1215 11/07/20 1300   BP: 123/79 122/72 125/69   BP  "Location: Left arm  Left arm   Patient Position: Sitting     Pulse: 73 67 89   Resp: 20 16 16   Temp: 98 °F (36.7 °C)     TempSrc: Infrared     SpO2: 100% 100% 100%   Weight: 79.4 kg (175 lb)     Height: 167.6 cm (66\")       Medications - No data to display  ECG/EMG Results (last 24 hours)     Procedure Component Value Units Date/Time    ECG 12 Lead [[id number]] Collected: 11/07/20 1125     Updated: 11/07/20 1123        ECG 12 Lead   Final Result   Test Reason : chest pain   Blood Pressure : **/** mmHG   Vent. Rate : 073 BPM     Atrial Rate : 073 BPM      P-R Int : 130 ms          QRS Dur : 080 ms       QT Int : 380 ms       P-R-T Axes : 059 071 049 degrees      QTc Int : 418 ms      Normal sinus rhythm   Normal ECG   No previous ECGs available   Confirmed by MD MOTA CORY ([id number]) on 11/9/2020 10:40:52 PM      Referred By:  EDMD           Confirmed By:EVELYN MOTA MD        No results found for this or any previous visit (from the past 24 hour(s)).  Note: In addition to lab results from this visit, the labs listed above may include labs taken at another facility or during a different encounter within the last 24 hours. Please correlate lab times with ED admission and discharge times for further clarification of the services performed during this visit.    XR Chest 1 View   Final Result   No acute cardiopulmonary disease.       DICTATED:   11/07/2020   EDITED/ls :   11/07/2020        This report was finalized on 11/8/2020 10:14 AM by Dr. Laureen Cahcko MD.            Vitals:    11/07/20 1120 11/07/20 1215 11/07/20 1300   BP: 123/79 122/72 125/69   BP Location: Left arm  Left arm   Patient Position: Sitting     Pulse: 73 67 89   Resp: 20 16 16   Temp: 98 °F (36.7 °C)     TempSrc: Infrared     SpO2: 100% 100% 100%   Weight: 79.4 kg (175 lb)     Height: 167.6 cm (66\")       Medications - No data to display  ECG/EMG Results (last 24 hours)     ** No results found for the last 24 hours. **        ECG 12 Lead   Final " Result   Test Reason : chest pain   Blood Pressure : **/** mmHG   Vent. Rate : 073 BPM     Atrial Rate : 073 BPM      P-R Int : 130 ms          QRS Dur : 080 ms       QT Int : 380 ms       P-R-T Axes : 059 071 049 degrees      QTc Int : 418 ms      Normal sinus rhythm   Normal ECG   No previous ECGs available   Confirmed by MD MOTA CORY ([id number]) on 11/9/2020 10:40:52 PM      Referred By:  EDMD           Confirmed By:EVELYN MOTA MD                    HEART Score (for prediction of 6-week risk of major adverse cardiac event) reviewed and/or performed as part of the patient evaluation and treatment planning process.  The result associated with this review/performance is: 1       MDM  Number of Diagnoses or Management Options  Atypical chest pain: new and requires workup  Palpitations: new and requires workup     Amount and/or Complexity of Data Reviewed  Clinical lab tests: reviewed and ordered  Tests in the radiology section of CPT®: reviewed and ordered  Tests in the medicine section of CPT®: ordered and reviewed  Discuss the patient with other providers: yes    Patient Progress  Patient progress: stable      Final diagnoses:   Atypical chest pain   Palpitations            Malvin Martini PA  11/11/20 [id number]

## 2020-11-09 LAB
QT INTERVAL: 380 MS
QTC INTERVAL: 418 MS

## 2020-11-12 NOTE — PROGRESS NOTES
[facility]  [facility]      2020         Maribel Zabala   [place] [facility] [id number]  [unfilled]    1995    Gretchen Tellez DO    Maribel Zabala is a 24 y.o. female.      Subjective:     Chief Complaint:  Establish Care and Palpitations       HPI   24-year-old female who presents today for evaluation of chest pain and palpitations at the request of Dr. Badillo.  Patient presented to the ED on  with intermittent chest pain and palpitations over the past several months.  She seen her primary care provider for this and had labs.  She reports the palpitations typically happen at random and occur approximately once a week.  She also gets some stabbing sharp chest pain that is sometimes associated palpitations but not always.  Palpitations typically last for seconds and resolves on their own. Symptoms started back in high school. Will also get heart racing. Worse with stress. She denies associated syncope but says she has passed out while giving blood. She does note near syncope.  Earlier this years she started getting shooting chest pain down her left arm, occurs a couple of times a month. No triggering factors. Walks about an hour a day and does have exertional dyspnea which she attributes to asthma.  She reports that her grandfather  suddenly in front of her in  and the trauma of this seem to start her symptoms.  Symptoms worsened in  when her father left her family.  She has seen counseling and was on Lexapro, which she currently is not taking    Cardiac risks: None    Patient Active Problem List   Diagnosis   • Encounter for other contraceptive management   • Encounter for routine gynecological examination   • Migraine headache   • Need for other prophylactic vaccination and inoculation against single diseases   • Idiopathic scoliosis   • Allergy to fish       Past Medical History:   Diagnosis Date   • Abdominal pain    • Broken foot      left-4/18   • Constipation     NOS   • Contraceptive surveillance     NEC   • Depression    • Foot pain     left   • Migraine headache    • Oral cyst     cyst under tongue   • URI, acute        Past Surgical History:   Procedure Laterality Date   • CHOLECYSTECTOMY  2011   • CYST REMOVAL      oral   • ENDOSCOPY         Family History   Problem Relation Age of Onset   • Hypertension Father    • Other Father         PTSD   • Migraines Father    • Diabetes Maternal Grandmother    • Hypertension Maternal Grandmother    • Lupus Paternal Grandmother    • Fibroids Paternal Grandmother    • Hypertension Paternal Grandfather    • Other Mother         Hypoglycemia   • No Known Problems Brother    • Asthma Brother        Social History     Socioeconomic History   • Marital status: Single     Spouse name: Not on file   • Number of children: Not on file   • Years of education: Not on file   • Highest education level: Not on file   Tobacco Use   • Smoking status: Never Smoker   • Smokeless tobacco: Never Used   Substance and Sexual Activity   • Alcohol use: Yes     Frequency: 2-4 times a month     Drinks per session: 1 or 2     Binge frequency: Less than monthly     Comment: Occ    • Drug use: Not Currently     Types: Marijuana     Comment: Use to    • Sexual activity: Defer   Social History Narrative    Caffeine: 1 soda weekly        Allergies   Allergen Reactions   • Gluten Meal GI Intolerance         Current Outpatient Medications:   •  azelastine (ASTELIN) 0.1 % nasal spray, , Disp: , Rfl:   •  cyclobenzaprine (FLEXERIL) 10 MG tablet, Take 1 tablet by mouth 3 (Three) Times a Day As Needed for Muscle Spasms., Disp: 60 tablet, Rfl: 0  •  Flovent  MCG/ACT inhaler, , Disp: , Rfl:   •  ibuprofen (ADVIL,MOTRIN) 800 MG tablet, Every 8 (Eight) Hours., Disp: , Rfl:   •  loratadine (Claritin) 10 MG tablet, Take 10 mg by mouth Daily., Disp: , Rfl:   •  norgestimate-ethinyl estradiol (Ortho-Cyclen, 28,) 0.25-35 MG-MCG per tablet,  "Take 1 tablet by mouth Daily., Disp: 84 tablet, Rfl: 3  •  ProAir  (90 Base) MCG/ACT inhaler, , Disp: , Rfl:   •  SUMAtriptan (IMITREX) 5 MG/ACT nasal spray, 1 spray into the nostril(s) as directed by provider Every 2 (Two) Hours As Needed for Migraine., Disp: 1 each, Rfl: 1  •  Polyethylene Glycol 3350 (MIRALAX PO), MIRALAX POWD, Disp: , Rfl:     The following portions of the patient's history were reviewed today and updated as appropriate: allergies, current medications, past family history, past medical history, past social history, past surgical history and problem list     Review of Systems   Constitution: Positive for malaise/fatigue. Negative for chills and fever.   HENT: Negative.    Eyes: Negative.    Cardiovascular: Positive for dyspnea on exertion, irregular heartbeat, near-syncope and palpitations. Negative for chest pain, claudication, cyanosis, leg swelling, orthopnea, paroxysmal nocturnal dyspnea and syncope.   Respiratory: Negative for cough, shortness of breath and snoring.    Endocrine: Negative.    Hematologic/Lymphatic: Does not bruise/bleed easily.   Skin: Negative for poor wound healing.   Musculoskeletal: Negative.    Gastrointestinal: Negative for abdominal pain, heartburn, hematemesis, melena, nausea and vomiting.   Genitourinary: Negative.  Negative for hematuria.   Neurological: Negative.    Psychiatric/Behavioral: The patient is nervous/anxious.    Allergic/Immunologic: Negative.        Objective:     Vitals:    11/13/20 0841 11/13/20 0844 11/13/20 0846   BP: 116/70 107/74 108/69   BP Location: Right arm Left arm Left arm   Patient Position: Sitting Standing Sitting   Cuff Size: Adult Adult Adult   Pulse: 83 98 86   Resp:   18   Temp:   97.7 °F (36.5 °C)   TempSrc:   Temporal   SpO2: 100% 100% 100%   Weight:   82.2 kg (181 lb 2 oz)   Height:   167.6 cm (66\")       Body mass index is 29.23 kg/m².    Vitals signs reviewed.   Constitutional:       General: Not in acute distress.     " Appearance: Well-developed.   Eyes:      Conjunctiva/sclera: Conjunctivae normal.      Pupils: Pupils are equal, round, and reactive to light.   HENT:      Head: Normocephalic.   Neck:      Musculoskeletal: Neck supple.      Thyroid: No thyromegaly.      Vascular: No JVD.   Pulmonary:      Effort: Pulmonary effort is normal. No respiratory distress.      Breath sounds: Normal breath sounds. No wheezing. No rales.   Chest:      Chest wall: Not tender to palpatation.   Cardiovascular:      Normal rate. Regular rhythm.      No gallop.   Pulses:     Intact distal pulses.   Edema:     Peripheral edema absent.   Abdominal:      General: Bowel sounds are normal.      Palpations: Abdomen is soft.   Musculoskeletal: Normal range of motion.   Skin:     General: Skin is warm and dry.   Neurological:      Mental Status: Alert and oriented to person, place, and time.   Psychiatric:         Behavior: Behavior normal.         Thought Content: Thought content normal.         Lab and Diagnostic Review:  Results for orders placed or performed during the hospital encounter of 11/07/20   Troponin    Specimen: Blood   Result Value Ref Range    Troponin T <0.010 0.000 - 0.030 ng/mL   Comprehensive Metabolic Panel    Specimen: Blood   Result Value Ref Range    Glucose 93 65 - 99 mg/dL    BUN 9 6 - 20 mg/dL    Creatinine 0.79 0.57 - 1.00 mg/dL    Sodium 139 136 - 145 mmol/L    Potassium 3.8 3.5 - 5.2 mmol/L    Chloride 106 98 - 107 mmol/L    CO2 24.0 22.0 - 29.0 mmol/L    Calcium 9.1 8.6 - 10.5 mg/dL    Total Protein 6.5 6.0 - 8.5 g/dL    Albumin 4.20 3.50 - 5.20 g/dL    ALT (SGPT) 9 1 - 33 U/L    AST (SGOT) 24 1 - 32 U/L    Alkaline Phosphatase 27 (L) 39 - 117 U/L    Total Bilirubin 0.4 0.0 - 1.2 mg/dL    eGFR Non African Amer 90 >60 mL/min/1.73    Globulin 2.3 gm/dL    A/G Ratio 1.8 g/dL    BUN/Creatinine Ratio 11.4 7.0 - 25.0    Anion Gap 9.0 5.0 - 15.0 mmol/L   Lipase    Specimen: Blood   Result Value Ref Range    Lipase 26 13 - 60 U/L    BNP    Specimen: Blood   Result Value Ref Range    proBNP 30.8 0.0 - 450.0 pg/mL   CBC Auto Differential    Specimen: Blood   Result Value Ref Range    WBC 4.20 3.40 - 10.80 10*3/mm3    RBC 4.38 3.77 - 5.28 10*6/mm3    Hemoglobin 12.5 12.0 - 15.9 g/dL    Hematocrit 39.2 34.0 - 46.6 %    MCV 89.5 79.0 - 97.0 fL    MCH 28.5 26.6 - 33.0 pg    MCHC 31.9 31.5 - 35.7 g/dL    RDW 13.3 12.3 - 15.4 %    RDW-SD 44.1 37.0 - 54.0 fl    MPV 11.1 6.0 - 12.0 fL    Platelets 181 140 - 450 10*3/mm3    Neutrophil % 44.8 42.7 - 76.0 %    Lymphocyte % 46.7 (H) 19.6 - 45.3 %    Monocyte % 5.7 5.0 - 12.0 %    Eosinophil % 1.9 0.3 - 6.2 %    Basophil % 0.7 0.0 - 1.5 %    Immature Grans % 0.2 0.0 - 0.5 %    Neutrophils, Absolute 1.88 1.70 - 7.00 10*3/mm3    Lymphocytes, Absolute 1.96 0.70 - 3.10 10*3/mm3    Monocytes, Absolute 0.24 0.10 - 0.90 10*3/mm3    Eosinophils, Absolute 0.08 0.00 - 0.40 10*3/mm3    Basophils, Absolute 0.03 0.00 - 0.20 10*3/mm3    Immature Grans, Absolute 0.01 0.00 - 0.05 10*3/mm3    nRBC 0.0 0.0 - 0.2 /100 WBC   D-dimer, Quantitative    Specimen: Blood   Result Value Ref Range    D-Dimer, Quantitative 0.30 0.00 - 0.56 MCGFEU/mL   TSH    Specimen: Blood   Result Value Ref Range    TSH 1.160 0.270 - 4.200 uIU/mL   ECG 12 Lead   Result Value Ref Range    QT Interval 380 ms    QTC Interval 418 ms   CXR 11/7/20  FINDINGS: Portable chest reveals cardiac and mediastinal silhouettes  within normal limits. The lung fields are clear. No focal parenchymal  opacification is present. No pleural effusion or pneumothorax. The bony  structures are unremarkable. The pulmonary vascularity is within normal  limits.     IMPRESSION:  No acute cardiopulmonary disease.       EKG 11/7/20 Normal sinus rhythm  Normal ECG  No previous ECGs available    Assessment and Plan:   1. Chest pain, atypical  Low ASCVD risks  - Adult Transthoracic Echo Complete W/ Cont if Necessary Per Protocol; Future    2. Palpitations    - Mobile Cardiac  Outpatient Telemetry; Future  - Adult Transthoracic Echo Complete W/ Cont if Necessary Per Protocol; Future    3. Near syncope  History of syncope but only during blood draws, likely vasovagal  - Mobile Cardiac Outpatient Telemetry; Future  - Adult Transthoracic Echo Complete W/ Cont if Necessary Per Protocol; Future    4. BARRETT (dyspnea on exertion)  - Adult Transthoracic Echo Complete W/ Cont if Necessary Per Protocol; Future    Telehealth visit in 6 weeks      It has been a pleasure to participate in the care of this patient.  Patient was instructed to call the [facility] with any questions, concerns, or worsening symptoms.    *Please note that portions of this note were completed with a voice recognition program. Efforts were made to edit the dictations, but occasionally words are mistranscribed.

## 2020-11-13 ENCOUNTER — OFFICE VISIT (OUTPATIENT)
Dept: CARDIOLOGY | Facility: HOSPITAL | Age: 25
End: 2020-11-13

## 2020-11-13 ENCOUNTER — HOSPITAL ENCOUNTER (OUTPATIENT)
Dept: CARDIOLOGY | Facility: HOSPITAL | Age: 25
Discharge: HOME | End: 2020-11-13

## 2020-11-13 VITALS
OXYGEN SATURATION: 100 % | RESPIRATION RATE: 18 BRPM | SYSTOLIC BLOOD PRESSURE: 108 MMHG | DIASTOLIC BLOOD PRESSURE: 69 MMHG | HEART RATE: 86 BPM | TEMPERATURE: 97.7 F | HEIGHT: 66 IN | WEIGHT: 181.13 LBS | BODY MASS INDEX: 29.11 KG/M2

## 2020-11-13 DIAGNOSIS — R00.2 PALPITATIONS: ICD-10-CM

## 2020-11-13 DIAGNOSIS — R07.89 CHEST PAIN, ATYPICAL: Primary | ICD-10-CM

## 2020-11-13 DIAGNOSIS — R06.09 DOE (DYSPNEA ON EXERTION): ICD-10-CM

## 2020-11-13 DIAGNOSIS — R55 NEAR SYNCOPE: ICD-10-CM

## 2020-11-13 PROCEDURE — 99214 OFFICE O/P EST MOD 30 MIN: CPT | Performed by: NURSE PRACTITIONER

## 2020-11-13 NOTE — PROGRESS NOTES
[facility] Heart Monitor Documentation    Maribel Zabala  1995  [id number]  11/13/20    BENIGNO Sal    [] ZIO XT Patch  Model [id number] Prescribed for N/A Days    · Serial Number: (N + 9 Digits) N   · Apply-By Date on Box:   · USPS Tracking Number:   · USPS Tracking        [x] [employer]ian MINI PLUS Mobile Cardiac Telemetry  Model BGMINIPLUS Prescribed for 30 Days    · Serial Number: (BGM + 7 Digits) [id number]  · Shipped-By Date on Box: 11/06/20  · UPS Tracking Number: [id number]   · UPS Tracking      [] [employer] MINI Holter Monitor  Model BGMINIEL Prescribed for N/A Days    · Serial Number: (7 Digits)   · Shipped-By Date on Box:   · UPS Tracking Number: [id number]  · UPS Tracking        This monitor was applied to the patient's chest and checked for proper functioning.  Ms. Maribel Zabala was instructed in the proper use of this monitor.  She was given the opportunity to ask questions and left the office with the device 's instruction manual.    Laura Burgess MA, 09:05 EST, 11/13/20                  [facility]

## 2020-11-19 DIAGNOSIS — E83.39 ALKALINE PHOSPHATASE DEFICIENCY: Primary | ICD-10-CM

## 2020-11-24 ENCOUNTER — RESULTS ENCOUNTER (OUTPATIENT)
Dept: FAMILY MEDICINE CLINIC | Facility: CLINIC | Age: 25
End: 2020-11-24

## 2020-11-24 DIAGNOSIS — E83.39 ALKALINE PHOSPHATASE DEFICIENCY: ICD-10-CM

## 2020-12-17 NOTE — PROGRESS NOTES
[facility]  Telemedicine note    12/28/2020         Maribel Zabala  [address] [facility] [address]  [unfilled]    1995    Gretchen Tellez DO    Maribel Zabala is a 25 y.o. female.      Subjective:     Chief Complaint:  Follow-up (monitor results)       This was an audio and video enabled telemedicine encounter.    HPI   25-year-old female who presents today for telemedicine follow-up on chest pain and palpitations.Patient wore 30-day MCOT which was negative for arrhythmias.  Triggered events were normal sinus rhythm.  Her echo was normal.  She reports her palpitations have improved, as well as her chest pain.  She feels that some of it was related to anxiety out of concern for possible heart problem.     Patient Active Problem List   Diagnosis   • Encounter for other contraceptive management   • Encounter for routine gynecological examination   • Migraine headache   • Need for other prophylactic vaccination and inoculation against single diseases   • Idiopathic scoliosis   • Allergy to fish   • Palpitations       Past Medical History:   Diagnosis Date   • Abdominal pain    • Broken foot     left-4/18   • Constipation     NOS   • Contraceptive surveillance     NEC   • Depression    • Foot pain     left   • Migraine headache    • Oral cyst     cyst under tongue   • URI, acute        Past Surgical History:   Procedure Laterality Date   • CHOLECYSTECTOMY  2011   • CYST REMOVAL      oral   • ENDOSCOPY         Family History   Problem Relation Age of Onset   • Hypertension Father    • Other Father         PTSD   • Migraines Father    • Diabetes Maternal Grandmother    • Hypertension Maternal Grandmother    • Lupus Paternal Grandmother    • Fibroids Paternal Grandmother    • Hypertension Paternal Grandfather    • Other Mother         Hypoglycemia   • No Known Problems Brother    • Asthma Brother        Social History     Socioeconomic History   • Marital  status: Single     Spouse name: Not on file   • Number of children: Not on file   • Years of education: Not on file   • Highest education level: Not on file   Tobacco Use   • Smoking status: Never Smoker   • Smokeless tobacco: Never Used   Substance and Sexual Activity   • Alcohol use: Yes     Frequency: 2-4 times a month     Drinks per session: 1 or 2     Binge frequency: Less than monthly     Comment: Occ    • Drug use: Not Currently     Types: Marijuana     Comment: Use to    • Sexual activity: Defer   Social History Narrative    Caffeine: 1 soda weekly        Allergies   Allergen Reactions   • Gluten Meal GI Intolerance         Current Outpatient Medications:   •  azelastine (ASTELIN) 0.1 % nasal spray, 1 spray into the nostril(s) as directed by provider Daily., Disp: , Rfl:   •  cyclobenzaprine (FLEXERIL) 10 MG tablet, Take 1 tablet by mouth 3 (Three) Times a Day As Needed for Muscle Spasms., Disp: 60 tablet, Rfl: 0  •  Flovent  MCG/ACT inhaler, Inhale 1 puff 2 (Two) Times a Day., Disp: , Rfl:   •  ibuprofen (ADVIL,MOTRIN) 800 MG tablet, Every 8 (Eight) Hours., Disp: , Rfl:   •  loratadine (Claritin) 10 MG tablet, Take 10 mg by mouth Daily., Disp: , Rfl:   •  norgestimate-ethinyl estradiol (Ortho-Cyclen, 28,) 0.25-35 MG-MCG per tablet, Take 1 tablet by mouth Daily., Disp: 84 tablet, Rfl: 3  •  ProAir  (90 Base) MCG/ACT inhaler, Inhale 1 puff Every 4 (Four) Hours As Needed., Disp: , Rfl:   •  SUMAtriptan (IMITREX) 5 MG/ACT nasal spray, 1 spray into the nostril(s) as directed by provider Every 2 (Two) Hours As Needed for Migraine., Disp: 1 each, Rfl: 1  •  EPINEPHrine (EPIPEN) 0.3 MG/0.3ML solution auto-injector injection, , Disp: , Rfl:     The following portions of the patient's history were reviewed today and updated as appropriate: allergies, current medications, past family history, past medical history, past social history, past surgical history and problem list     Review of Systems  "  Constitution: Negative for chills and fever.   HENT: Negative.    Eyes: Negative.    Cardiovascular: Negative for chest pain, claudication, cyanosis, dyspnea on exertion, irregular heartbeat, leg swelling, near-syncope, orthopnea, palpitations, paroxysmal nocturnal dyspnea and syncope.   Respiratory: Negative for cough, shortness of breath and snoring.    Endocrine: Negative.    Hematologic/Lymphatic: Does not bruise/bleed easily.   Skin: Negative for poor wound healing.   Musculoskeletal: Negative.    Gastrointestinal: Negative for abdominal pain, heartburn, hematemesis, melena, nausea and vomiting.   Genitourinary: Negative.  Negative for hematuria.   Neurological: Negative.    Psychiatric/Behavioral: Negative.    Allergic/Immunologic: Negative.        Objective:     Vitals:    12/28/20 0924   BP: 120/72   BP Location: Right arm   Patient Position: Sitting   Cuff Size: Adult   Pulse: 70   Weight: 81.2 kg (179 lb)   Height: 167.6 cm (66\")       Body mass index is 28.89 kg/m².    Vitals signs reviewed.   Constitutional:       Appearance: Normal and healthy appearance. Not in distress.   Pulmonary:      Effort: Pulmonary effort is normal. No respiratory distress.   Neurological:      Mental Status: Alert and oriented to person, place and time.   Psychiatric:         Attention and Perception: Attention normal.         Mood and Affect: Mood normal.         Speech: Speech normal.         Behavior: Behavior normal.         Lab and Diagnostic Review:  Echo 12/27/20  · Estimated left ventricular EF = 55%. Left ventricular systolic function is normal.  · Mild tricuspid valve regurgitation is present.    MCOT x30 days 11/2020 showed an average heart rate of 82, minimum heart of 53 and maximal heart of 171 bpm.  No arrhythmias noted.  There are rare PVCs and PACs.  Triggered events were normal sinus rhythm.    Assessment and Plan:   1. Palpitations  No arrhythmias on MCOT, reviewed with patient today.  No structural heart " problems  Symptoms seem to be more related to anxiety.  Advised patient to call with any new or worsening symptoms      This visit has been scheduled as a video visit to comply with patient safety concerns in accordance with CDC recommendations. Total time of discussion was 10 minutes.      It has been a pleasure to participate in the care of this patient.  Patient was instructed to call the [facility] with any questions, concerns, or worsening symptoms.    *Please note that portions of this note were completed with a voice recognition program. Efforts were made to edit the dictations, but occasionally words are mistranscribed.

## 2020-12-27 ENCOUNTER — HOSPITAL ENCOUNTER (OUTPATIENT)
Dept: CARDIOLOGY | Facility: HOSPITAL | Age: 25
Discharge: HOME | End: 2020-12-27
Admitting: NURSE PRACTITIONER

## 2020-12-27 VITALS — HEIGHT: 66 IN | WEIGHT: 181 LBS | BODY MASS INDEX: 29.09 KG/M2

## 2020-12-27 DIAGNOSIS — R07.89 CHEST PAIN, ATYPICAL: ICD-10-CM

## 2020-12-27 DIAGNOSIS — R00.2 PALPITATIONS: ICD-10-CM

## 2020-12-27 DIAGNOSIS — R55 NEAR SYNCOPE: ICD-10-CM

## 2020-12-27 DIAGNOSIS — R06.09 DOE (DYSPNEA ON EXERTION): ICD-10-CM

## 2020-12-27 PROCEDURE — 93306 TTE W/DOPPLER COMPLETE: CPT | Performed by: INTERNAL MEDICINE

## 2020-12-27 PROCEDURE — 93306 TTE W/DOPPLER COMPLETE: CPT

## 2020-12-28 ENCOUNTER — TELEMEDICINE (OUTPATIENT)
Dept: CARDIOLOGY | Facility: HOSPITAL | Age: 25
End: 2020-12-28

## 2020-12-28 VITALS
BODY MASS INDEX: 28.77 KG/M2 | SYSTOLIC BLOOD PRESSURE: 120 MMHG | HEART RATE: 70 BPM | DIASTOLIC BLOOD PRESSURE: 72 MMHG | WEIGHT: 179 LBS | HEIGHT: 66 IN

## 2020-12-28 DIAGNOSIS — R00.2 PALPITATIONS: Primary | ICD-10-CM

## 2020-12-28 LAB
BH CV ECHO MEAS - AO MAX PG (FULL): 2.8 MMHG
BH CV ECHO MEAS - AO MAX PG: 6.3 MMHG
BH CV ECHO MEAS - AO MEAN PG (FULL): 1.6 MMHG
BH CV ECHO MEAS - AO MEAN PG: 3.4 MMHG
BH CV ECHO MEAS - AO ROOT AREA (BSA CORRECTED): 1.5
BH CV ECHO MEAS - AO ROOT AREA: 6.8 CM^2
BH CV ECHO MEAS - AO ROOT DIAM: 2.9 CM
BH CV ECHO MEAS - AO V2 MAX: 125.4 CM/SEC
BH CV ECHO MEAS - AO V2 MEAN: 86.6 CM/SEC
BH CV ECHO MEAS - AO V2 VTI: 28.3 CM
BH CV ECHO MEAS - AVA(I,A): 2.1 CM^2
BH CV ECHO MEAS - AVA(I,D): 2.1 CM^2
BH CV ECHO MEAS - AVA(V,A): 2 CM^2
BH CV ECHO MEAS - AVA(V,D): 2 CM^2
BH CV ECHO MEAS - BSA(HAYCOCK): 2 M^2
BH CV ECHO MEAS - BSA: 1.9 M^2
BH CV ECHO MEAS - BZI_BMI: 29.2 KILOGRAMS/M^2
BH CV ECHO MEAS - BZI_METRIC_HEIGHT: 167.6 CM
BH CV ECHO MEAS - BZI_METRIC_WEIGHT: 82.1 KG
BH CV ECHO MEAS - EDV(CUBED): 101.1 ML
BH CV ECHO MEAS - EDV(MOD-SP2): 81 ML
BH CV ECHO MEAS - EDV(MOD-SP4): 89 ML
BH CV ECHO MEAS - EDV(TEICH): 100.2 ML
BH CV ECHO MEAS - EF(CUBED): 57.6 %
BH CV ECHO MEAS - EF(MOD-BP): 59 %
BH CV ECHO MEAS - EF(MOD-SP2): 55.6 %
BH CV ECHO MEAS - EF(MOD-SP4): 61.8 %
BH CV ECHO MEAS - EF(TEICH): 49.3 %
BH CV ECHO MEAS - ESV(CUBED): 42.9 ML
BH CV ECHO MEAS - ESV(MOD-SP2): 36 ML
BH CV ECHO MEAS - ESV(MOD-SP4): 34 ML
BH CV ECHO MEAS - ESV(TEICH): 50.9 ML
BH CV ECHO MEAS - FS: 24.9 %
BH CV ECHO MEAS - IVS/LVPW: 0.94
BH CV ECHO MEAS - IVSD: 0.58 CM
BH CV ECHO MEAS - LA DIMENSION: 2.5 CM
BH CV ECHO MEAS - LA/AO: 0.84
BH CV ECHO MEAS - LAD MAJOR: 5.1 CM
BH CV ECHO MEAS - LAT PEAK E' VEL: 15.3 CM/SEC
BH CV ECHO MEAS - LATERAL E/E' RATIO: 5.7
BH CV ECHO MEAS - LV DIASTOLIC VOL/BSA (35-75): 46.4 ML/M^2
BH CV ECHO MEAS - LV MASS(C)D: 83.2 GRAMS
BH CV ECHO MEAS - LV MASS(C)DI: 43.4 GRAMS/M^2
BH CV ECHO MEAS - LV MAX PG: 3.5 MMHG
BH CV ECHO MEAS - LV MEAN PG: 1.8 MMHG
BH CV ECHO MEAS - LV SYSTOLIC VOL/BSA (12-30): 17.7 ML/M^2
BH CV ECHO MEAS - LV V1 MAX: 94.1 CM/SEC
BH CV ECHO MEAS - LV V1 MEAN: 62.5 CM/SEC
BH CV ECHO MEAS - LV V1 VTI: 21.6 CM
BH CV ECHO MEAS - LVIDD: 4.7 CM
BH CV ECHO MEAS - LVIDS: 3.5 CM
BH CV ECHO MEAS - LVLD AP2: 7.2 CM
BH CV ECHO MEAS - LVLD AP4: 7.5 CM
BH CV ECHO MEAS - LVLS AP2: 6 CM
BH CV ECHO MEAS - LVLS AP4: 5.9 CM
BH CV ECHO MEAS - LVOT AREA (M): 2.8 CM^2
BH CV ECHO MEAS - LVOT AREA: 2.7 CM^2
BH CV ECHO MEAS - LVOT DIAM: 1.9 CM
BH CV ECHO MEAS - LVPWD: 0.62 CM
BH CV ECHO MEAS - MED PEAK E' VEL: 10.4 CM/SEC
BH CV ECHO MEAS - MEDIAL E/E' RATIO: 8.4
BH CV ECHO MEAS - MV A MAX VEL: 50.9 CM/SEC
BH CV ECHO MEAS - MV DEC TIME: 0.21 SEC
BH CV ECHO MEAS - MV E MAX VEL: 87.5 CM/SEC
BH CV ECHO MEAS - MV E/A: 1.7
BH CV ECHO MEAS - PA ACC SLOPE: 586.9 CM/SEC^2
BH CV ECHO MEAS - PA ACC TIME: 0.14 SEC
BH CV ECHO MEAS - PA PR(ACCEL): 17.2 MMHG
BH CV ECHO MEAS - PULM DIAS VEL: 49.4 CM/SEC
BH CV ECHO MEAS - PULM S/D: 1.3
BH CV ECHO MEAS - PULM SYS VEL: 62.4 CM/SEC
BH CV ECHO MEAS - RAP SYSTOLE: 8 MMHG
BH CV ECHO MEAS - RVSP: 23 MMHG
BH CV ECHO MEAS - SI(AO): 100.7 ML/M^2
BH CV ECHO MEAS - SI(CUBED): 30.4 ML/M^2
BH CV ECHO MEAS - SI(LVOT): 30.7 ML/M^2
BH CV ECHO MEAS - SI(MOD-SP2): 23.5 ML/M^2
BH CV ECHO MEAS - SI(MOD-SP4): 28.7 ML/M^2
BH CV ECHO MEAS - SI(TEICH): 25.7 ML/M^2
BH CV ECHO MEAS - SV(AO): 193 ML
BH CV ECHO MEAS - SV(CUBED): 58.2 ML
BH CV ECHO MEAS - SV(LVOT): 58.9 ML
BH CV ECHO MEAS - SV(MOD-SP2): 45 ML
BH CV ECHO MEAS - SV(MOD-SP4): 55 ML
BH CV ECHO MEAS - SV(TEICH): 49.4 ML
BH CV ECHO MEAS - TAPSE (>1.6): 1.9 CM
BH CV ECHO MEAS - TR MAX PG: 15 MMHG
BH CV ECHO MEAS - TR MAX VEL: 190.3 CM/SEC
BH CV ECHO MEASUREMENTS AVERAGE E/E' RATIO: 6.81
BH CV XLRA - RV BASE: 3.7 CM
BH CV XLRA - RV LENGTH: 6 CM
BH CV XLRA - RV MID: 2.8 CM
BH CV XLRA - TDI S': 9.65 CM/SEC
LV EF 2D ECHO EST: 55 %
MAXIMAL PREDICTED HEART RATE: 196 BPM
STRESS TARGET HR: 167 BPM

## 2020-12-28 PROCEDURE — 99213 OFFICE O/P EST LOW 20 MIN: CPT | Performed by: NURSE PRACTITIONER

## 2020-12-28 RX ORDER — EPINEPHRINE 0.3 MG/.3ML
INJECTION SUBCUTANEOUS
COMMUNITY
Start: 2020-11-23

## 2020-12-29 PROCEDURE — 93228 REMOTE 30 DAY ECG REV/REPORT: CPT | Performed by: INTERNAL MEDICINE

## 2021-05-30 RX ORDER — NORGESTIMATE AND ETHINYL ESTRADIOL 0.25-0.035
KIT ORAL
Qty: 84 TABLET | Refills: 3 | OUTPATIENT
Start: 2021-05-30

## 2021-06-01 RX ORDER — NORGESTIMATE AND ETHINYL ESTRADIOL 0.25-0.035
1 KIT ORAL DAILY
Qty: 84 TABLET | Refills: 3 | OUTPATIENT
Start: 2021-06-01

## 2021-06-07 NOTE — TELEPHONE ENCOUNTER
Caller: Maribel Zabala    Relationship: Self    Best call back number:[phone]     Medication needed:   Requested Prescriptions     Pending Prescriptions Disp Refills   • norgestimate-ethinyl estradiol (Ortho-Cyclen, 28,) 0.25-35 MG-MCG per tablet 84 tablet 3     Sig: Take 1 tablet by mouth Daily.     What additional details did the patient provide when requesting the medication: PATIENT STATES SHE HAS TRIED TO FILL THIS PRESCRIPTION THROUGH MY CHART BUT IT KEEPS GETTING DENIED     Does the patient have less than a 3 day supply:  [] Yes  [x] No    What is the patient's preferred pharmacy: [facility] - [facility], [address] [phone] [facility] [phone]

## 2021-06-08 RX ORDER — NORGESTIMATE AND ETHINYL ESTRADIOL 0.25-0.035
1 KIT ORAL DAILY
Qty: 84 TABLET | Refills: 3 | Status: SHIPPED | OUTPATIENT
Start: 2021-06-08 | End: 2022-01-31 | Stop reason: SDUPTHER

## 2021-06-08 NOTE — TELEPHONE ENCOUNTER
The request is going to her previous provider, which is why it is getting denied. I have refilled it

## 2021-08-16 ENCOUNTER — OFFICE VISIT (OUTPATIENT)
Dept: FAMILY MEDICINE CLINIC | Facility: CLINIC | Age: 26
End: 2021-08-16

## 2021-08-16 VITALS
RESPIRATION RATE: 20 BRPM | TEMPERATURE: 98.1 F | HEIGHT: 66 IN | WEIGHT: 169.4 LBS | DIASTOLIC BLOOD PRESSURE: 70 MMHG | SYSTOLIC BLOOD PRESSURE: 118 MMHG | BODY MASS INDEX: 27.23 KG/M2 | HEART RATE: 71 BPM | OXYGEN SATURATION: 100 %

## 2021-08-16 DIAGNOSIS — R19.7 DIARRHEA, UNSPECIFIED TYPE: ICD-10-CM

## 2021-08-16 DIAGNOSIS — R10.9 ABDOMINAL BLOATING WITH CRAMPS: ICD-10-CM

## 2021-08-16 DIAGNOSIS — Z00.00 ANNUAL PHYSICAL EXAM: Primary | ICD-10-CM

## 2021-08-16 DIAGNOSIS — H81.10 BENIGN PAROXYSMAL POSITIONAL VERTIGO, UNSPECIFIED LATERALITY: ICD-10-CM

## 2021-08-16 DIAGNOSIS — R14.0 ABDOMINAL BLOATING WITH CRAMPS: ICD-10-CM

## 2021-08-16 PROCEDURE — 99395 PREV VISIT EST AGE 18-39: CPT | Performed by: FAMILY MEDICINE

## 2021-08-16 NOTE — PROGRESS NOTES
Subjective   Chief Complaint   Patient presents with   • Annual Exam     denies PAP today currently on men cycle        History of Present Illness     Maribel Zabala is a 25 y.o. woman who comes in today for an annual exam.  Her most recent Pap smear was on 5/2019 and showed no abnormalities. Previous abnormal Pap smears: no. Contraception: OCP (estrogen/progesterone)  LMP: 8/16/21  Dental Exam: overdue, waiting to get dental insurance  Vision Exam: overdue, waiting to get vision insurance  Diet & Exercise: She walks routinely for exercise     She would like to be checked for Celiac disease  She has abdominal cramps, bloating, diarrhea   Previously saw GI in outside state, checked labs and were negative. States that she wasn't consuming gluten at the time, so labs were likely not accurate. She has incorporated gluten into her diet over the last 4 to 6 weeks.    She has vertigo, becoming more frequent. Occurs if she looks down and back up, room spins. Has been occurring for years.   Hasn't seen ENT for this, but saw ENT regarding salivary gland inflammation.     The following portions of the patient's history were reviewed and updated as appropriate: allergies, current medications, past family history, past medical history, past social history, past surgical history and problem list.    Review of Systems   Constitutional: Negative for activity change, appetite change and fever.   HENT: Negative.    Respiratory: Negative.    Cardiovascular: Negative.    Gastrointestinal: Positive for abdominal distention (bloating), abdominal pain (cramps) and diarrhea.   Skin: Negative for rash.   Allergic/Immunologic: Negative for environmental allergies.   Neurological: Positive for dizziness. Negative for light-headedness, headache and confusion.   Psychiatric/Behavioral: Negative.        Objective   Physical Exam  Vitals and nursing note reviewed.   Constitutional:       Appearance: Normal appearance. She is well-developed.    HENT:      Head: Normocephalic and atraumatic.      Right Ear: Tympanic membrane, ear canal and external ear normal.      Left Ear: Tympanic membrane, ear canal and external ear normal.      Nose: Nose normal.   Eyes:      Conjunctiva/sclera: Conjunctivae normal.   Cardiovascular:      Rate and Rhythm: Normal rate and regular rhythm.      Heart sounds: Normal heart sounds. No murmur heard.     Pulmonary:      Effort: Pulmonary effort is normal.      Breath sounds: Normal breath sounds. No wheezing.   Musculoskeletal:         General: No deformity.      Cervical back: Neck supple.   Lymphadenopathy:      Cervical: No cervical adenopathy.   Skin:     General: Skin is warm and dry.   Neurological:      Mental Status: She is alert and oriented to person, place, and time.   Psychiatric:         Mood and Affect: Mood normal.         Behavior: Behavior normal.           Assessment/Plan   Diagnoses and all orders for this visit:    1. Annual physical exam (Primary)  -     CBC & Differential  -     Comprehensive Metabolic Panel  -     TSH Rfx On Abnormal To Free T4    2. Benign paroxysmal positional vertigo, unspecified laterality  -     CBC & Differential  -     Comprehensive Metabolic Panel  -     TSH Rfx On Abnormal To Free T4  -     Ambulatory Referral to Physical Therapy Vestibular    3. Abdominal bloating with cramps  -     CBC & Differential  -     Comprehensive Metabolic Panel  -     Celiac Disease Antibody Screen  -     TSH Rfx On Abnormal To Free T4    4. Diarrhea, unspecified type  -     CBC & Differential  -     Comprehensive Metabolic Panel  -     Celiac Disease Antibody Screen  -     TSH Rfx On Abnormal To Free T4    Refer to PT for treatment of vertigo, consider ENT consult  Labs completed today, Celiac panel to check gluten intolerance. Consider consult with GI.     Health advice: healthy food choices with fresh fruits and vegetables, maintain sleep pattern at least 8 hours, avoid texting and distracted  driving practices; wear safety belt, engage in regular exercise, maintain healthy weight, use safe sex practices, avoid alcohol and illicit drugs. Maintain immunizations that are up to date. Maintain health maintenance: Pap, etc.  Follow up with PCP if struggling with depression or anxiety. Keep regular dental and eye exams. Brush and floss teeth daily.   F/U annually and prn.

## 2021-08-16 NOTE — PATIENT INSTRUCTIONS
Go to the nearest ER or return to clinic if symptoms worsen, fever/chill develop    Preventive Care 21-39 Years Old, Female  Preventive care refers to lifestyle choices and visits with your health care provider that can promote health and wellness. This includes:  · A yearly physical exam. This is also called an annual wellness visit.  · Regular dental and eye exams.  · Immunizations.  · Screening for certain conditions.  · Healthy lifestyle choices, such as:  ? Eating a healthy diet.  ? Getting regular exercise.  ? Not using drugs or products that contain nicotine and tobacco.  ? Limiting alcohol use.  What can I expect for my preventive care visit?  Physical exam  Your health care provider may check your:  · Height and weight. These may be used to calculate your BMI (body mass index). BMI is a measurement that tells if you are at a healthy weight.  · Heart rate and blood pressure.  · Body temperature.  · Skin for abnormal spots.  Counseling  Your health care provider may ask you questions about your:  · Past medical problems.  · Family's medical history.  · Alcohol, tobacco, and drug use.  · Emotional well-being.  · Home life and relationship well-being.  · Sexual activity.  · Diet, exercise, and sleep habits.  · Work and work environment.  · Access to firearms.  · Method of birth control.  · Menstrual cycle.  · Pregnancy history.  What immunizations do I need?    Vaccines are usually given at various ages, according to a schedule. Your health care provider will recommend vaccines for you based on your age, medical history, and lifestyle or other factors, such as travel or where you work.  What tests do I need?    Blood tests  · Lipid and cholesterol levels. These may be checked every 5 years starting at age 20.  · Hepatitis C test.  · Hepatitis B test.  Screening  · Diabetes screening. This is done by checking your blood sugar (glucose) after you have not eaten for a while (fasting).  · STD (sexually transmitted  disease) testing, if you are at risk.  · BRCA-related cancer screening. This may be done if you have a family history of breast, ovarian, tubal, or peritoneal cancers.  · Pelvic exam and Pap test. This may be done every 3 years starting at age 21. Starting at age 30, this may be done every 5 years if you have a Pap test in combination with an HPV test.  Talk with your health care provider about your test results, treatment options, and if necessary, the need for more tests.  Follow these instructions at home:  Eating and drinking    · Eat a healthy diet that includes fresh fruits and vegetables, whole grains, lean protein, and low-fat dairy products.  · Take vitamin and mineral supplements as recommended by your health care provider.  · Do not drink alcohol if:  ? Your health care provider tells you not to drink.  ? You are pregnant, may be pregnant, or are planning to become pregnant.  · If you drink alcohol:  ? Limit how much you have to 0-1 drink a day.  ? Be aware of how much alcohol is in your drink. In the [place] one drink equals one 12 oz bottle of beer (355 mL), one 5 oz glass of wine (148 mL), or one 1½ oz glass of hard liquor (44 mL).  Lifestyle  · Take daily care of your teeth and gums. Brush your teeth every morning and night with fluoride toothpaste. Floss one time each day.  · Stay active. Exercise for at least 30 minutes 5 or more days each week.  · Do not use any products that contain nicotine or tobacco, such as cigarettes, e-cigarettes, and chewing tobacco. If you need help quitting, ask your health care provider.  · Do not use drugs.  · If you are sexually active, practice safe sex. Use a condom or other form of birth control (contraception) in order to prevent pregnancy and STIs (sexually transmitted infections). If you plan to become pregnant, see your health care provider for a pre-conception visit.  · Find healthy ways to cope with stress, such as:  ? Meditation, yoga, or listening to  music.  ? Journaling.  ? Talking to a trusted person.  ? Spending time with friends and family.  Safety  · Always wear your seat belt while driving or riding in a vehicle.  · Do not drive:  ? If you have been drinking alcohol. Do not ride with someone who has been drinking.  ? When you are tired or distracted.  ? While texting.  · Wear a helmet and other protective equipment during sports activities.  · If you have firearms in your house, make sure you follow all gun safety procedures.  · Seek help if you have been physically or sexually abused.  What's next?  · Go to your health care provider once a year for an annual wellness visit.  · Ask your health care provider how often you should have your eyes and teeth checked.  · Stay up to date on all vaccines.  This information is not intended to replace advice given to you by your health care provider. Make sure you discuss any questions you have with your health care provider.  Document Revised: 09/02/2020 Document Reviewed: 08/29/2019  Elsevier Patient Education © 2021 [employer]

## 2021-08-17 LAB
ALBUMIN SERPL-MCNC: 4.4 G/DL (ref 3.5–5.2)
ALBUMIN/GLOB SERPL: 2 G/DL
ALP SERPL-CCNC: 29 U/L (ref 39–117)
ALT SERPL-CCNC: 27 U/L (ref 1–33)
AST SERPL-CCNC: 24 U/L (ref 1–32)
BASOPHILS # BLD AUTO: 0.04 10*3/MM3 (ref 0–0.2)
BASOPHILS NFR BLD AUTO: 0.8 % (ref 0–1.5)
BILIRUB SERPL-MCNC: 0.3 MG/DL (ref 0–1.2)
BUN SERPL-MCNC: 9 MG/DL (ref 6–20)
BUN/CREAT SERPL: 12.3 (ref 7–25)
CALCIUM SERPL-MCNC: 9.3 MG/DL (ref 8.6–10.5)
CHLORIDE SERPL-SCNC: 105 MMOL/L (ref 98–107)
CO2 SERPL-SCNC: 22.4 MMOL/L (ref 22–29)
CREAT SERPL-MCNC: 0.73 MG/DL (ref 0.57–1)
EOSINOPHIL # BLD AUTO: 0.14 10*3/MM3 (ref 0–0.4)
EOSINOPHIL NFR BLD AUTO: 2.9 % (ref 0.3–6.2)
ERYTHROCYTE [DISTWIDTH] IN BLOOD BY AUTOMATED COUNT: 13.1 % (ref 12.3–15.4)
GLIADIN PEPTIDE IGA SER-ACNC: 5 UNITS (ref 0–19)
GLOBULIN SER CALC-MCNC: 2.2 GM/DL
GLUCOSE SERPL-MCNC: 68 MG/DL (ref 65–99)
HCT VFR BLD AUTO: 41.3 % (ref 34–46.6)
HGB BLD-MCNC: 13 G/DL (ref 12–15.9)
IGA SERPL-MCNC: 117 MG/DL (ref 87–352)
IMM GRANULOCYTES # BLD AUTO: 0.01 10*3/MM3 (ref 0–0.05)
IMM GRANULOCYTES NFR BLD AUTO: 0.2 % (ref 0–0.5)
LYMPHOCYTES # BLD AUTO: 1.81 10*3/MM3 (ref 0.7–3.1)
LYMPHOCYTES NFR BLD AUTO: 37.9 % (ref 19.6–45.3)
MCH RBC QN AUTO: 28.5 PG (ref 26.6–33)
MCHC RBC AUTO-ENTMCNC: 31.5 G/DL (ref 31.5–35.7)
MCV RBC AUTO: 90.6 FL (ref 79–97)
MONOCYTES # BLD AUTO: 0.3 10*3/MM3 (ref 0.1–0.9)
MONOCYTES NFR BLD AUTO: 6.3 % (ref 5–12)
NEUTROPHILS # BLD AUTO: 2.48 10*3/MM3 (ref 1.7–7)
NEUTROPHILS NFR BLD AUTO: 51.9 % (ref 42.7–76)
NRBC BLD AUTO-RTO: 0 /100 WBC (ref 0–0.2)
PLATELET # BLD AUTO: 166 10*3/MM3 (ref 140–450)
POTASSIUM SERPL-SCNC: 4.4 MMOL/L (ref 3.5–5.2)
PROT SERPL-MCNC: 6.6 G/DL (ref 6–8.5)
RBC # BLD AUTO: 4.56 10*6/MM3 (ref 3.77–5.28)
SODIUM SERPL-SCNC: 144 MMOL/L (ref 136–145)
TSH SERPL DL<=0.005 MIU/L-ACNC: 1.82 UIU/ML (ref 0.27–4.2)
TTG IGA SER-ACNC: <2 U/ML (ref 0–3)
WBC # BLD AUTO: 4.78 10*3/MM3 (ref 3.4–10.8)

## 2021-08-22 DIAGNOSIS — R10.9 ABDOMINAL BLOATING WITH CRAMPS: Primary | ICD-10-CM

## 2021-08-22 DIAGNOSIS — R19.7 DIARRHEA, UNSPECIFIED TYPE: ICD-10-CM

## 2021-08-22 DIAGNOSIS — R14.0 ABDOMINAL BLOATING WITH CRAMPS: Primary | ICD-10-CM

## 2022-01-31 RX ORDER — NORGESTIMATE AND ETHINYL ESTRADIOL 0.25-0.035
1 KIT ORAL DAILY
Qty: 84 TABLET | Refills: 0 | Status: SHIPPED | OUTPATIENT
Start: 2022-01-31 | End: 2022-04-18 | Stop reason: SDUPTHER

## 2022-02-02 RX ORDER — NORGESTIMATE AND ETHINYL ESTRADIOL 0.25-0.035
KIT ORAL
Qty: 84 TABLET | Refills: 0 | OUTPATIENT
Start: 2022-02-02

## 2022-03-14 ENCOUNTER — HOSPITAL ENCOUNTER (OUTPATIENT)
Dept: GENERAL RADIOLOGY | Facility: HOSPITAL | Age: 27
Discharge: HOME | End: 2022-03-14
Admitting: FAMILY MEDICINE

## 2022-03-14 ENCOUNTER — OFFICE VISIT (OUTPATIENT)
Dept: FAMILY MEDICINE CLINIC | Facility: CLINIC | Age: 27
End: 2022-03-14

## 2022-03-14 VITALS
WEIGHT: 177 LBS | SYSTOLIC BLOOD PRESSURE: 102 MMHG | HEIGHT: 66 IN | RESPIRATION RATE: 16 BRPM | TEMPERATURE: 98 F | BODY MASS INDEX: 28.45 KG/M2 | OXYGEN SATURATION: 98 % | HEART RATE: 74 BPM | DIASTOLIC BLOOD PRESSURE: 68 MMHG

## 2022-03-14 DIAGNOSIS — G89.29 CHRONIC PAIN OF RIGHT HIP: Primary | ICD-10-CM

## 2022-03-14 DIAGNOSIS — M25.551 CHRONIC PAIN OF RIGHT HIP: Primary | ICD-10-CM

## 2022-03-14 PROCEDURE — 99213 OFFICE O/P EST LOW 20 MIN: CPT | Performed by: FAMILY MEDICINE

## 2022-03-14 PROCEDURE — 73502 X-RAY EXAM HIP UNI 2-3 VIEWS: CPT

## 2022-03-14 RX ORDER — NAPROXEN 375 MG/1
375 TABLET, DELAYED RELEASE ORAL 2 TIMES DAILY PRN
Qty: 60 TABLET | Refills: 1 | Status: SHIPPED | OUTPATIENT
Start: 2022-03-14

## 2022-03-14 NOTE — PROGRESS NOTES
"Chief Complaint  R hip pain x Oct  (Cold weather makes it worse)    Subjective          Maribel Zabala presents to [facility]  Hip Pain   There was no injury mechanism. The pain is present in the right hip and right thigh. The quality of the pain is described as aching (sharp). The pain is at a severity of 1/10 (10/10 when pain is at worst). The pain is mild. The pain has been fluctuating since onset. Associated symptoms include a loss of motion (when pain is flared) and tingling. Pertinent negatives include no numbness. She has tried acetaminophen for the symptoms. The treatment provided mild relief.   Family history of lupus and fibromyalgia     Answers for HPI/ROS submitted by the patient on 3/14/2022  Please describe your symptoms.: Hip pain  Have you had these symptoms before?: No  How long have you been having these symptoms?: Greater than 2 weeks  What is the primary reason for your visit?: Other      The following portions of the patient's history were reviewed and updated as appropriate: allergies, current medications, past family history, past medical history, past social history, past surgical history and problem list.    Objective   Vital Signs:   /68   Pulse 74   Temp 98 °F (36.7 °C)   Resp 16   Ht 167.6 cm (65.98\")   Wt 80.3 kg (177 lb)   SpO2 98%   BMI 28.59 kg/m²     Physical Exam  Vitals and nursing note reviewed.   Constitutional:       Appearance: Normal appearance. She is well-developed.   HENT:      Head: Normocephalic and atraumatic.      Right Ear: External ear normal.      Left Ear: External ear normal.   Eyes:      Conjunctiva/sclera: Conjunctivae normal.   Cardiovascular:      Rate and Rhythm: Normal rate and regular rhythm.      Heart sounds: Normal heart sounds. No murmur heard.  Pulmonary:      Effort: Pulmonary effort is normal.      Breath sounds: Normal breath sounds.   Musculoskeletal:         General: No deformity.      Cervical " back: Neck supple.      Right hip: Tenderness (lateral hip greater trochanter) present. No deformity or bony tenderness. Decreased range of motion (internal rotation). Normal strength.   Skin:     General: Skin is warm.   Neurological:      Mental Status: She is alert and oriented to person, place, and time.   Psychiatric:         Behavior: Behavior normal.        Result Review :                 Assessment and Plan    Diagnoses and all orders for this visit:    1. Chronic pain of right hip (Primary)  -     XR Hip With or Without Pelvis 2 - 3 View Right  -     CBC & Differential  -     Comprehensive Metabolic Panel  -     JODI by IFA, Reflex 9-biomarkers profile  -     C-reactive Protein  -     Sedimentation Rate  -     naproxen (EC NAPROSYN) 375 MG tablet delayed-release EC tablet; Take 1 tablet by mouth 2 (Two) Times a Day As Needed for Mild Pain .  Dispense: 60 tablet; Refill: 1    Xray and labs to evaluate   NSAID prn to treat pain. Possible bursitis, recommend warm and cold compresses as needed  Consider PT vs orthopedic consultation      Follow Up   Return if symptoms worsen or fail to improve.  Patient was given instructions and counseling regarding her condition or for health maintenance advice. Please see specific information pulled into the AVS if appropriate.

## 2022-03-15 LAB
ALBUMIN SERPL-MCNC: 4.4 G/DL (ref 3.5–5.2)
ALBUMIN/GLOB SERPL: 1.8 G/DL
ALP SERPL-CCNC: 34 U/L (ref 39–117)
ALT SERPL-CCNC: 11 U/L (ref 1–33)
ANA TITR SER IF: NEGATIVE {TITER}
AST SERPL-CCNC: 15 U/L (ref 1–32)
BASOPHILS # BLD AUTO: 0.03 10*3/MM3 (ref 0–0.2)
BASOPHILS NFR BLD AUTO: 0.6 % (ref 0–1.5)
BILIRUB SERPL-MCNC: 0.3 MG/DL (ref 0–1.2)
BUN SERPL-MCNC: 12 MG/DL (ref 6–20)
BUN/CREAT SERPL: 14.5 (ref 7–25)
CALCIUM SERPL-MCNC: 9.6 MG/DL (ref 8.6–10.5)
CHLORIDE SERPL-SCNC: 103 MMOL/L (ref 98–107)
CO2 SERPL-SCNC: 24 MMOL/L (ref 22–29)
CREAT SERPL-MCNC: 0.83 MG/DL (ref 0.57–1)
CRP SERPL-MCNC: 0.49 MG/DL (ref 0–0.5)
EGFRCR SERPLBLD CKD-EPI 2021: 100.5 ML/MIN/1.73
EOSINOPHIL # BLD AUTO: 0.23 10*3/MM3 (ref 0–0.4)
EOSINOPHIL NFR BLD AUTO: 4.3 % (ref 0.3–6.2)
ERYTHROCYTE [DISTWIDTH] IN BLOOD BY AUTOMATED COUNT: 12.7 % (ref 12.3–15.4)
ERYTHROCYTE [SEDIMENTATION RATE] IN BLOOD BY WESTERGREN METHOD: 2 MM/HR (ref 0–20)
GLOBULIN SER CALC-MCNC: 2.4 GM/DL
GLUCOSE SERPL-MCNC: 85 MG/DL (ref 65–99)
HCT VFR BLD AUTO: 38.4 % (ref 34–46.6)
HGB BLD-MCNC: 12.8 G/DL (ref 12–15.9)
IMM GRANULOCYTES # BLD AUTO: 0 10*3/MM3 (ref 0–0.05)
IMM GRANULOCYTES NFR BLD AUTO: 0 % (ref 0–0.5)
LABORATORY COMMENT REPORT: NORMAL
LYMPHOCYTES # BLD AUTO: 2.1 10*3/MM3 (ref 0.7–3.1)
LYMPHOCYTES NFR BLD AUTO: 39.2 % (ref 19.6–45.3)
MCH RBC QN AUTO: 28.8 PG (ref 26.6–33)
MCHC RBC AUTO-ENTMCNC: 33.3 G/DL (ref 31.5–35.7)
MCV RBC AUTO: 86.3 FL (ref 79–97)
MONOCYTES # BLD AUTO: 0.31 10*3/MM3 (ref 0.1–0.9)
MONOCYTES NFR BLD AUTO: 5.8 % (ref 5–12)
NEUTROPHILS # BLD AUTO: 2.69 10*3/MM3 (ref 1.7–7)
NEUTROPHILS NFR BLD AUTO: 50.1 % (ref 42.7–76)
NRBC BLD AUTO-RTO: 0 /100 WBC (ref 0–0.2)
PLATELET # BLD AUTO: 197 10*3/MM3 (ref 140–450)
POTASSIUM SERPL-SCNC: 4.6 MMOL/L (ref 3.5–5.2)
PROT SERPL-MCNC: 6.8 G/DL (ref 6–8.5)
RBC # BLD AUTO: 4.45 10*6/MM3 (ref 3.77–5.28)
SODIUM SERPL-SCNC: 139 MMOL/L (ref 136–145)
WBC # BLD AUTO: 5.36 10*3/MM3 (ref 3.4–10.8)

## 2022-03-21 ENCOUNTER — TELEPHONE (OUTPATIENT)
Dept: FAMILY MEDICINE CLINIC | Facility: CLINIC | Age: 27
End: 2022-03-21

## 2022-03-21 NOTE — TELEPHONE ENCOUNTER
Caller: Maribel Zabala    Relationship: Self    Best call back number: [phone]    Who are you requesting to speak with (clinical staff, provider,  specific staff member): LORRI     What was the call regarding: TEST RESULTS     Do you require a callback: YES    UNABLE TO WARM TRANSFER

## 2022-04-19 RX ORDER — NORGESTIMATE AND ETHINYL ESTRADIOL 0.25-0.035
1 KIT ORAL DAILY
Qty: 84 TABLET | Refills: 0 | Status: SHIPPED | OUTPATIENT
Start: 2022-04-19 | End: 2022-06-28

## 2022-05-02 ENCOUNTER — OFFICE VISIT (OUTPATIENT)
Dept: GASTROENTEROLOGY | Facility: CLINIC | Age: 27
End: 2022-05-02

## 2022-05-02 VITALS
HEIGHT: 66 IN | WEIGHT: 176 LBS | HEART RATE: 82 BPM | BODY MASS INDEX: 28.28 KG/M2 | DIASTOLIC BLOOD PRESSURE: 80 MMHG | TEMPERATURE: 98.1 F | SYSTOLIC BLOOD PRESSURE: 110 MMHG | OXYGEN SATURATION: 98 %

## 2022-05-02 DIAGNOSIS — K90.41 GLUTEN INTOLERANCE: Primary | ICD-10-CM

## 2022-05-02 PROCEDURE — 99213 OFFICE O/P EST LOW 20 MIN: CPT | Performed by: NURSE PRACTITIONER

## 2022-05-02 NOTE — PROGRESS NOTES
New Patient Consultation     Patient Name: Maribel Zabala  : 1995   MRN: [id number]     Chief Complaint:    Chief Complaint   Patient presents with   • Abdominal Pain   • Constipation       History of Present Illness: Maribel Zabala is a 26 y.o. female who is here today for a Gastroenterology Consultation for abdominal pain.  Maribel is here today with concern of celiac disease.  She reports abdominal pain, constipation, nausea, bloating, fatigue, and brain fog with gluten ingestion.  It is even sensitive to the point of cross-contamination.  She has had a celiac panel in the past that was normal. She did have an EGD when she was 16- she is unsure if there was a duodenal biopsy performed.  Her symptoms completely resolved with gluten avoidance.  She has been eating gluten the past several weeks to get an accurate EGD.  This has resulted in her symptoms.    Abdominal surgical history includes cholecystectomy  Subjective      Review of Systems:   Review of Systems   Constitutional: Positive for fatigue. Negative for appetite change and unexpected weight loss.   HENT: Negative for trouble swallowing.    Gastrointestinal: Positive for abdominal distention, abdominal pain and constipation. Negative for anal bleeding, blood in stool, diarrhea, nausea, rectal pain, vomiting, GERD and indigestion.       Past Medical History:   Past Medical History:   Diagnosis Date   • Abdominal pain    • Broken foot     left-   • Constipation     NOS   • Contraceptive surveillance     NEC   • Depression    • Foot pain     left   • GERD (gastroesophageal reflux disease) N/A    Diagnosed early childhood   • Irritable bowel syndrome N/A    It was diagnosed in my childhood.   • Migraine headache    • Oral cyst     cyst under tongue   • URI, acute        Past Surgical History:   Past Surgical History:   Procedure Laterality Date   • CHOLECYSTECTOMY     • CYST REMOVAL      oral   • ENDOSCOPY         Family History:    Family History   Problem Relation Age of Onset   • Hypertension Father    • Other Father         PTSD   • Migraines Father    • Irritable bowel syndrome Father    • Diabetes Maternal Grandmother    • Hypertension Maternal Grandmother    • Lupus Paternal Grandmother    • Fibroids Paternal Grandmother    • Hypertension Paternal Grandfather    • Alcohol abuse Paternal Grandfather    • Other Mother         Hypoglycemia   • No Known Problems Brother    • Asthma Brother    • Alcohol abuse Paternal Uncle        Social History:   Social History     Socioeconomic History   • Marital status:    Tobacco Use   • Smoking status: Never Smoker   • Smokeless tobacco: Never Used   Substance and Sexual Activity   • Alcohol use: Yes     Comment: Occ    • Drug use: Not Currently     Types: Marijuana     Comment: Use to    • Sexual activity: Defer       Alcohol/Tobacco History:   Social History     Substance and Sexual Activity   Alcohol Use Yes    Comment: Occ      Social History     Tobacco Use   Smoking Status Never Smoker   Smokeless Tobacco Never Used       Medications:     Current Outpatient Medications:   •  azelastine (ASTELIN) 0.1 % nasal spray, 1 spray into the nostril(s) as directed by provider Daily., Disp: , Rfl:   •  EPINEPHrine (EPIPEN) 0.3 MG/0.3ML solution auto-injector injection, , Disp: , Rfl:   •  Flovent  MCG/ACT inhaler, Inhale 1 puff 2 (Two) Times a Day., Disp: , Rfl:   •  naproxen (EC NAPROSYN) 375 MG tablet delayed-release EC tablet, Take 1 tablet by mouth 2 (Two) Times a Day As Needed for Mild Pain ., Disp: 60 tablet, Rfl: 1  •  norgestimate-ethinyl estradiol (Ortho-Cyclen, 28,) 0.25-35 MG-MCG per tablet, Take 1 tablet by mouth Daily., Disp: 84 tablet, Rfl: 0  •  ProAir  (90 Base) MCG/ACT inhaler, Inhale 1 puff Every 4 (Four) Hours As Needed., Disp: , Rfl:   •  SUMAtriptan (IMITREX) 5 MG/ACT nasal spray, 1 spray into the nostril(s) as directed by provider Every 2 (Two) Hours As Needed for  "Migraine., Disp: 1 each, Rfl: 1    Allergies:   Allergies   Allergen Reactions   • Fish-Derived Products Hives and Nausea And Vomiting   • Gluten Meal GI Intolerance       Objective     Physical Exam:  Vital Signs:   Vitals:    05/02/22 1016   BP: 110/80   BP Location: Left arm   Patient Position: Sitting   Cuff Size: Adult   Pulse: 82   Temp: 98.1 °F (36.7 °C)   TempSrc: Temporal   SpO2: 98%   Weight: 79.8 kg (176 lb)   Height: 167.6 cm (65.98\")     Body mass index is 28.42 kg/m².     Physical Exam  Vitals and nursing note reviewed.   Constitutional:       General: She is not in acute distress.     Appearance: She is well-developed. She is not diaphoretic.   Eyes:      General: No scleral icterus.     Conjunctiva/sclera: Conjunctivae normal.   Neck:      Thyroid: No thyromegaly.   Cardiovascular:      Rate and Rhythm: Normal rate and regular rhythm.   Pulmonary:      Effort: Pulmonary effort is normal.      Breath sounds: Normal breath sounds.   Abdominal:      General: Bowel sounds are normal. There is no distension.      Palpations: Abdomen is soft.      Tenderness: There is generalized abdominal tenderness and tenderness in the right upper quadrant. There is no guarding or rebound.      Hernia: No hernia is present.   Musculoskeletal:      Cervical back: Neck supple.      Right lower leg: No edema.      Left lower leg: No edema.   Skin:     General: Skin is warm and dry.      Capillary Refill: Capillary refill takes 2 to 3 seconds.      Coloration: Skin is not jaundiced or pale.      Findings: No bruising or petechiae.      Nails: There is no clubbing.   Neurological:      Mental Status: She is alert and oriented to person, place, and time.   Psychiatric:         Behavior: Behavior normal.         Thought Content: Thought content normal.         Judgment: Judgment normal.       Reviewed referring provider office note, labs  Assessment / Plan      Assessment/Plan:   Diagnoses and all orders for this visit:    1. " Gluten intolerance (Primary)  -     Ambulatory referral for Screening EGD    Differentials include celiac, gluten intolerance, IBS, gluten allergy.  I have given her samples of IBgard to try while introducing gluten into her diet.  We have discussed speaking with an allergist for allergy testing.  After EGD, resume strict gluten avoidance.  If symptoms completely resolve with gluten avoidance, I have advised her she does not have to follow regularly in the clinic but may return with new/concerning symptoms or questions at any time    Follow Up:   Return if symptoms worsen or fail to improve.    Plan of care reviewed with the patient at the conclusion of today's visit.  Education was provided regarding diagnosis, management, and any prescribed or recommended OTC medications.  Patient verbalized understanding of and agreement with management plan.     Time Statement:   Discussed plan of care in detail with patient today. Patient verbally understands and agrees. I have spent 30 minutes reviewing available diagnostics, obtaining history, examining the patient, developing a treatment plan, and educating the patient on disease process and plan of care.    BENIGNO Verdugo  [facility]

## 2022-06-13 ENCOUNTER — OUTSIDE FACILITY SERVICE (OUTPATIENT)
Dept: GASTROENTEROLOGY | Facility: CLINIC | Age: 27
End: 2022-06-13

## 2022-06-13 PROCEDURE — 88305 TISSUE EXAM BY PATHOLOGIST: CPT | Performed by: INTERNAL MEDICINE

## 2022-06-13 PROCEDURE — 43239 EGD BIOPSY SINGLE/MULTIPLE: CPT | Performed by: INTERNAL MEDICINE

## 2022-06-14 ENCOUNTER — LAB REQUISITION (OUTPATIENT)
Dept: LAB | Facility: HOSPITAL | Age: 27
End: 2022-06-14

## 2022-06-14 DIAGNOSIS — K90.41 NON-CELIAC GLUTEN SENSITIVITY: ICD-10-CM

## 2022-06-14 DIAGNOSIS — K21.00 GASTRO-ESOPHAGEAL REFLUX DISEASE WITH ESOPHAGITIS, WITHOUT BLEEDING: ICD-10-CM

## 2022-06-14 DIAGNOSIS — R10.10 UPPER ABDOMINAL PAIN, UNSPECIFIED: ICD-10-CM

## 2022-06-14 DIAGNOSIS — K22.89 OTHER SPECIFIED DISEASE OF ESOPHAGUS: ICD-10-CM

## 2022-06-15 LAB
CYTO UR: NORMAL
LAB AP CASE REPORT: NORMAL
LAB AP CLINICAL INFORMATION: NORMAL
LAB AP DIAGNOSIS COMMENT: NORMAL
PATH REPORT.FINAL DX SPEC: NORMAL
PATH REPORT.GROSS SPEC: NORMAL

## 2022-06-17 ENCOUNTER — TELEPHONE (OUTPATIENT)
Dept: GASTROENTEROLOGY | Facility: CLINIC | Age: 27
End: 2022-06-17

## 2022-06-17 NOTE — TELEPHONE ENCOUNTER
Argelia,  Patient called back and advised of results. Patient would like to know what are the next steps for EOE. Please advise?

## 2022-06-17 NOTE — TELEPHONE ENCOUNTER
----- Message from Won Hutton MD sent at 6/16/2022  5:18 PM EDT -----  There were changes of eosinophilic esophagitis on the mid and distal esophageal biopsies.  The small bowel biopsy did not show any obvious changes for celiac disease.

## 2022-06-20 ENCOUNTER — TELEPHONE (OUTPATIENT)
Dept: GASTROENTEROLOGY | Facility: CLINIC | Age: 27
End: 2022-06-20

## 2022-06-20 NOTE — TELEPHONE ENCOUNTER
Discussed diagnosis with patient. Her symptoms do resolve with gluten avoidance- will plan with complete gluten avoidance and repeat EGD in 12 weeks.    Can you schedule her for an EGD in 12 weeks and a f/u after? thanks

## 2022-06-20 NOTE — TELEPHONE ENCOUNTER
Patient wants to wait to schedule a little closer to time.  Will put in a recall so she will get a letter.

## 2022-06-28 RX ORDER — NORGESTIMATE AND ETHINYL ESTRADIOL 0.25-0.035
KIT ORAL
Qty: 84 TABLET | Refills: 3 | Status: SHIPPED | OUTPATIENT
Start: 2022-06-28 | End: 2023-03-29

## 2023-01-23 ENCOUNTER — HOSPITAL ENCOUNTER (EMERGENCY)
Facility: HOSPITAL | Age: 28
Discharge: HOME | End: 2023-01-23
Attending: EMERGENCY MEDICINE | Admitting: EMERGENCY MEDICINE
Payer: COMMERCIAL

## 2023-01-23 VITALS
HEIGHT: 66 IN | TEMPERATURE: 98.6 F | BODY MASS INDEX: 28.12 KG/M2 | RESPIRATION RATE: 16 BRPM | OXYGEN SATURATION: 99 % | WEIGHT: 175 LBS | HEART RATE: 87 BPM | SYSTOLIC BLOOD PRESSURE: 129 MMHG | DIASTOLIC BLOOD PRESSURE: 83 MMHG

## 2023-01-23 DIAGNOSIS — H11.32 SUBCONJUNCTIVAL HEMORRHAGE OF LEFT EYE: ICD-10-CM

## 2023-01-23 DIAGNOSIS — H10.32 ACUTE BACTERIAL CONJUNCTIVITIS OF LEFT EYE: Primary | ICD-10-CM

## 2023-01-23 PROCEDURE — 99282 EMERGENCY DEPT VISIT SF MDM: CPT

## 2023-01-23 RX ORDER — CIPROFLOXACIN HYDROCHLORIDE 3.5 MG/ML
2 SOLUTION/ DROPS TOPICAL EVERY 4 HOURS
Qty: 5 ML | Refills: 0 | Status: SHIPPED | OUTPATIENT
Start: 2023-01-23 | End: 2023-01-28

## 2023-01-23 NOTE — ED PROVIDER NOTES
Subjective   History of Present Illness  27-year-old female presents emergency department today with left eye irritation.  She reports she noticed that had some bleeding in the white part of her eye for about 2 days.  She states that this morning she noticed that there was a some crusting of the lower lid.  She has no eye pain.  Little bit of discomfort.  States there may been a little blurred vision associate with the crusting he does not wear contacts.  She has had no fevers no chills no trauma to the eye.  She has no other complaints.  She does not take an anticoagulant.  She try to get into see her ophthalmologist was unable to do so so she came to the emergency department.    History provided by:  Patient and significant other   used: No    Eye Problem  Location:  Left eye  Quality:  Dull  Severity:  Mild  Onset quality:  Gradual  Duration:  2 days  Timing:  Constant  Progression:  Worsening  Chronicity:  New  Context: not chemical exposure, not contact lens problem, not foreign body, not using machinery and not scratch    Relieved by:  Nothing  Worsened by:  Nothing  Ineffective treatments:  None tried  Associated symptoms: blurred vision and crusting    Associated symptoms: no decreased vision, no facial rash, no headaches, no itching, no nausea, no numbness, no photophobia, no redness, no tearing, no tingling and no vomiting    Risk factors: conjunctival hemorrhage    Risk factors: no recent herpes zoster        Review of Systems   Constitutional: Negative for chills and fever.   Eyes: Positive for blurred vision. Negative for photophobia, redness and itching.   Respiratory: Negative for chest tightness, shortness of breath and wheezing.    Cardiovascular: Negative for chest pain and palpitations.   Gastrointestinal: Negative for abdominal pain, nausea and vomiting.   Genitourinary: Negative for dysuria, frequency and urgency.   Musculoskeletal: Negative for back pain and neck pain.    Skin: Negative for pallor and rash.   Neurological: Negative for tingling, numbness and headaches.   Psychiatric/Behavioral: Negative.    All other systems reviewed and are negative.      Past Medical History:   Diagnosis Date   • Abdominal pain    • Broken foot     left-4/18   • Constipation     NOS   • Contraceptive surveillance     NEC   • Depression    • Foot pain     left   • GERD (gastroesophageal reflux disease) N/A    Diagnosed early childhood   • Irritable bowel syndrome N/A    It was diagnosed in my childhood.   • Migraine headache    • Oral cyst     cyst under tongue   • URI, acute        Allergies   Allergen Reactions   • Fish-Derived Products Hives and Nausea And Vomiting   • Gluten Meal GI Intolerance       Past Surgical History:   Procedure Laterality Date   • CHOLECYSTECTOMY  2011   • CYST REMOVAL      oral   • ENDOSCOPY         Family History   Problem Relation Age of Onset   • Hypertension Father    • Other Father         PTSD   • Migraines Father    • Irritable bowel syndrome Father    • Diabetes Maternal Grandmother    • Hypertension Maternal Grandmother    • Lupus Paternal Grandmother    • Fibroids Paternal Grandmother    • Hypertension Paternal Grandfather    • Alcohol abuse Paternal Grandfather    • Other Mother         Hypoglycemia   • No Known Problems Brother    • Asthma Brother    • Alcohol abuse Paternal Uncle        Social History     Socioeconomic History   • Marital status:    Tobacco Use   • Smoking status: Never   • Smokeless tobacco: Never   Substance and Sexual Activity   • Alcohol use: Yes     Comment: Occ    • Drug use: Not Currently     Types: Marijuana     Comment: Use to    • Sexual activity: Defer           Objective   Physical Exam  Vitals and nursing note reviewed.   Constitutional:       Appearance: She is well-developed.   HENT:      Head: Normocephalic and atraumatic.      Right Ear: External ear normal.      Left Ear: External ear normal.      Nose: Nose  normal.   Eyes:      General: Lids are normal. Lids are everted, no foreign bodies appreciated. Vision grossly intact. Gaze aligned appropriately. No allergic shiner, visual field deficit or scleral icterus.        Left eye: Discharge present.No foreign body or hordeolum.      Extraocular Movements:      Right eye: Normal extraocular motion and no nystagmus.      Left eye: Normal extraocular motion and no nystagmus.      Conjunctiva/sclera:      Left eye: Left conjunctiva is not injected. Hemorrhage present. No chemosis or exudate.    Neck:      Thyroid: No thyromegaly.   Cardiovascular:      Rate and Rhythm: Regular rhythm.   Pulmonary:      Effort: Pulmonary effort is normal. No respiratory distress.   Musculoskeletal:         General: Normal range of motion.      Cervical back: Normal range of motion.   Lymphadenopathy:      Cervical: No cervical adenopathy.   Skin:     General: Skin is warm and dry.   Neurological:      Mental Status: She is alert and oriented to person, place, and time.      Cranial Nerves: No cranial nerve deficit.      Coordination: Coordination normal.      Deep Tendon Reflexes: Reflexes are normal and symmetric. Reflexes normal.   Psychiatric:         Behavior: Behavior normal.         Thought Content: Thought content normal.         Judgment: Judgment normal.         Procedures           ED Course                                           Medical Decision Making  Discussed the subconjunctival hemorrhage.  Discussed that she may have a bacterial conjunctivitis with having purulent drainage of the left eye.  Regarding give her a antibiotic drop to use she will have a follow-up with  ophthalmology for any problems return here for any problems    Acute bacterial conjunctivitis of left eye: acute illness or injury  Subconjunctival hemorrhage of left eye: acute illness or injury  Risk  Prescription drug management.          Final diagnoses:   Acute bacterial conjunctivitis of left eye    Subconjunctival hemorrhage of left eye       ED Disposition  ED Disposition     ED Disposition   Discharge    Condition   Stable    Comment   --             Gretchen Tellez  [address]  [facility] [address] [phone]           [facility]  [address] [place] [address]  [phone]             Medication List      New Prescriptions    ciprofloxacin 0.3 % ophthalmic solution  Commonly known as: CILOXAN  Administer 2 drops into the left eye Every 4 (Four) Hours for 5 days.           Where to Get Your Medications      These medications were sent to [facility] [id number] - [place], [place] - [address] [place] [address] AT [facility] - [phone]  - [phone]   [address] [facility] [address]    Phone: [phone]   · ciprofloxacin 0.3 % ophthalmic solution          Malvin Martini PA  01/23/23 3465

## 2023-02-24 ENCOUNTER — OFFICE VISIT (OUTPATIENT)
Dept: FAMILY MEDICINE CLINIC | Facility: CLINIC | Age: 28
End: 2023-02-24
Payer: COMMERCIAL

## 2023-02-24 VITALS
DIASTOLIC BLOOD PRESSURE: 64 MMHG | WEIGHT: 180 LBS | RESPIRATION RATE: 12 BRPM | HEART RATE: 76 BPM | BODY MASS INDEX: 28.93 KG/M2 | TEMPERATURE: 97.7 F | HEIGHT: 66 IN | SYSTOLIC BLOOD PRESSURE: 98 MMHG | OXYGEN SATURATION: 97 %

## 2023-02-24 DIAGNOSIS — Z12.4 PAP SMEAR FOR CERVICAL CANCER SCREENING: ICD-10-CM

## 2023-02-24 DIAGNOSIS — K20.0 EOSINOPHILIC ESOPHAGITIS: ICD-10-CM

## 2023-02-24 DIAGNOSIS — Z01.419 WELL FEMALE EXAM WITH ROUTINE GYNECOLOGICAL EXAM: Primary | ICD-10-CM

## 2023-02-24 DIAGNOSIS — G43.109 MIGRAINE WITH AURA AND WITHOUT STATUS MIGRAINOSUS, NOT INTRACTABLE: ICD-10-CM

## 2023-02-24 PROCEDURE — 99395 PREV VISIT EST AGE 18-39: CPT | Performed by: FAMILY MEDICINE

## 2023-02-24 RX ORDER — OMEPRAZOLE 20 MG/1
20 CAPSULE, DELAYED RELEASE ORAL DAILY
Qty: 90 CAPSULE | Refills: 3 | Status: SHIPPED | OUTPATIENT
Start: 2023-02-24

## 2023-02-24 RX ORDER — SUMATRIPTAN 5 MG/1
1 SPRAY NASAL ONCE AS NEEDED
Qty: 1 EACH | Refills: 5 | Status: SHIPPED | OUTPATIENT
Start: 2023-02-24

## 2023-02-24 NOTE — PROGRESS NOTES
Subjective   Chief Complaint   Patient presents with   • Annual Exam     Physical with Pap, last mp January 25, 2023, last pap 2019       Neurologic Problem  The patient's primary symptoms include clumsiness, a visual change and weakness. The patient's pertinent negatives include no altered mental status, focal sensory loss, focal weakness, loss of balance, memory loss, near-syncope, slurred speech or syncope. This is a recurrent problem. The current episode started more than 1 year ago. The neurological problem developed insidiously. The problem is unchanged. There was no focality noted. Associated symptoms include an aura, dizziness, fatigue, headaches, light-headedness, nausea and vertigo. Pertinent negatives include no abdominal pain, auditory change, back pain, bladder incontinence, bowel incontinence, chest pain, confusion, diaphoresis, fever, neck pain, palpitations, shortness of breath or vomiting. (During migraine) Past treatments include acetaminophen, aspirin, bed rest, drinking, eating, medication, sleep and walking. The treatment provided mild relief.   Requesting refill of nasal sumatriptan hand to treat migraines.  She would also like to see a neurologist due to increasing migraines.     Maribel Zabala is a 27 y.o. woman who comes in today for a  pap smear. Her most recent Pap smear was 2019 and showed no abnormalities. Previous abnormal Pap smears: no. Contraception: OCP (estrogen/progesterone).  She would like to consider IUD placement.  LMP: 1/25/23    She is having GERD symptoms. She completed EGD June 2022, which revealed eosinophilic esophagitis   It was recommended that she take medication to treat GERD, requesting a prescription for this.  In the past, she has taken omeprazole.      The following portions of the patient's history were reviewed and updated as appropriate: allergies, current medications, past family history, past medical history, past social history, past surgical history and  problem list.        Objective   Physical Exam  Vitals reviewed.   Constitutional:       Appearance: She is well-developed.   Cardiovascular:      Rate and Rhythm: Normal rate and regular rhythm.      Heart sounds: Normal heart sounds.   Pulmonary:      Effort: Pulmonary effort is normal.      Breath sounds: Normal breath sounds.   Chest:   Breasts:     Right: Normal. No inverted nipple, mass, nipple discharge, skin change or tenderness.      Left: Normal. No inverted nipple, mass, nipple discharge, skin change or tenderness.   Genitourinary:     General: Normal vulva.      Vagina: Normal.      Cervix: Normal.      Uterus: Normal.       Adnexa: Right adnexa normal and left adnexa normal.   Lymphadenopathy:      Upper Body:      Right upper body: No supraclavicular, axillary or pectoral adenopathy.      Left upper body: No supraclavicular, axillary or pectoral adenopathy.   Neurological:      Mental Status: She is alert.   Psychiatric:         Behavior: Behavior normal.           Assessment & Plan   Diagnoses and all orders for this visit:    1. Well female exam with routine gynecological exam (Primary)  -     LIQUID-BASED PAP SMEAR, [facility] ([facility]); Future    2. Pap smear for cervical cancer screening  -     LIQUID-BASED PAP SMEAR, [facility] ([facility]); Future    3. Migraine with aura and without status migrainosus, not intractable  -     SUMAtriptan (Imitrex) 5 MG/ACT nasal spray; 1 spray into the nostril(s) as directed by provider 1 (One) Time As Needed for Migraine for up to 1 dose. May repeat dose x 1 after 2 hours  Dispense: 1 each; Refill: 5  -     Ambulatory Referral to Neurology    4. Eosinophilic esophagitis  -     omeprazole (priLOSEC) 20 MG capsule; Take 1 capsule by mouth Daily.  Dispense: 90 capsule; Refill: 3    Pap smear completed today  She will notify me if she would like a referral to gynecology for IUD placement.    Health advice: healthy food choices with fresh fruits and vegetables,  maintain sleep pattern at least 8 hours, avoid texting and distracted driving practices; wear safety belt, engage in regular exercise, maintain healthy weight, use safe sex practices, avoid alcohol and illicit drugs. Maintain immunizations that are up to date. Maintain health maintenance: Pap, etc.  Follow up with PCP if struggling with depression or anxiety. Keep regular dental and eye exams. Brush and floss teeth daily.   F/U annually and prn.

## 2023-02-27 LAB — REF LAB TEST METHOD: NORMAL

## 2023-03-09 NOTE — PROGRESS NOTES
Chief Complaint   Patient presents with   • Contraception   • vaginal dryness   • decreased sex drive          Subjective   HPI  Maribel Zabala is a 27 y.o. female, , LMP was on Patient's last menstrual period was 2023 (within days). who presents for birth control concerns. She is currently on Estarylla Birth Control Pill and has been for many years but desires an alternative that may provide less SE.    The patient's current method of contraception is contraceptive methods: OCP (estrogen/progesterone). She is not satisfied with her current method of birth control due to vaginal dryness, decreased sex drive, and weight gain. She is having trouble losing the weight gain.. She would like to discuss other options  for birth control. The patient reports additional symptoms as breast tenderness and pelvic pain.     Her periods occur every 25-35 days , lasting 5 days. The flow is moderate.. She reports dysmenorrhea is moderate, occurring throughout cycle.     Partner Status: Marital Status: . She is sexually active. She has not had new partners.    Additional OB/GYN History   Thromboembolic Disease: none  History of hypertension: no  History of migraines: yes   Tobacco Usage?: No   Age of menarche: 14    Last Pap : 2023 negative  Last Completed Pap Smear          PAP SMEAR (Every 3 Years) Next due on 2023  SCANNED - PAP SMEAR    2023  Done    2023  LIQUID-BASED PAP SMEAR, [facility] ([facility])    2019  Done              History of abnormal Pap smear: no      Current Outpatient Medications:   •  azelastine (ASTELIN) 0.1 % nasal spray, 1 spray into the nostril(s) as directed by provider Daily., Disp: , Rfl:   •  EPINEPHrine (EPIPEN) 0.3 MG/0.3ML solution auto-injector injection, , Disp: , Rfl:   •  Estarylla 0.25-35 MG-MCG per tablet, TAKE 1 TABLET DAILY, Disp: 84 tablet, Rfl: 3  •  Flovent  MCG/ACT inhaler, Inhale 1 puff 2 (Two) Times a Day.,  "Disp: , Rfl:   •  naproxen (EC NAPROSYN) 375 MG tablet delayed-release EC tablet, Take 1 tablet by mouth 2 (Two) Times a Day As Needed for Mild Pain ., Disp: 60 tablet, Rfl: 1  •  omeprazole (priLOSEC) 20 MG capsule, Take 1 capsule by mouth Daily., Disp: 90 capsule, Rfl: 3  •  ProAir  (90 Base) MCG/ACT inhaler, Inhale 1 puff Every 4 (Four) Hours As Needed., Disp: , Rfl:   •  SUMAtriptan (Imitrex) 5 MG/ACT nasal spray, 1 spray into the nostril(s) as directed by provider 1 (One) Time As Needed for Migraine for up to 1 dose. May repeat dose x 1 after 2 hours, Disp: 1 each, Rfl: 5     Past Medical History:   Diagnosis Date   • Abdominal pain    • Allergic    • Anxiety    • Asthma    • Broken foot     left-4/18   • Constipation     NOS   • Contraceptive surveillance     NEC   • Depression    • Eosinophilic esophagitis 06/2022   • Foot pain     left   • GERD (gastroesophageal reflux disease) N/A    Diagnosed early childhood   • Irritable bowel syndrome N/A    It was diagnosed in my childhood.   • Migraine headache    • Oral cyst     cyst under tongue   • URI, acute         Past Surgical History:   Procedure Laterality Date   • CHOLECYSTECTOMY  2011   • CYST REMOVAL      oral   • ENDOSCOPY         The additional following portions of the patient's history were reviewed and updated as appropriate: allergies, current medications, past family history, past medical history, past social history, past surgical history and problem list.    Review of Systems   Constitutional: Negative.    Respiratory: Negative.    Cardiovascular: Negative.    Gastrointestinal: Negative.    Genitourinary: Positive for decreased libido, pelvic pain and vaginal pain (vaginal dryness ).   All other systems reviewed and are negative.      I have reviewed and agree with the HPI, ROS, and historical information as entered above. Erma Arreaga, APRN    Objective   /70   Ht 167.6 cm (65.98\")   Wt 81.8 kg (180 lb 6.4 oz)   LMP 02/22/2023 " (Within Days)   BMI 29.13 kg/m²     Physical Exam  Constitutional:       Appearance: Normal appearance.   Pulmonary:      Effort: Pulmonary effort is normal.   Neurological:      Mental Status: She is alert.         Assessment & Plan     Assessment     Problem List Items Addressed This Visit    None  Visit Diagnoses     Encounter for other general counseling or advice on contraception    -  Primary    Pelvic pain        Relevant Orders    US Non-ob Transvaginal        She reports pelvic cramping throughout cycle.     1. Counseling on alternative methods of birth control provided  2. Counseling on use of IUDs provided  3. RTO for U/S due to pelvic cramping and kyleena placement. Risk/benefit/SE reviewed, brochure given. Place on menses, stay on pill for now.       Erma Arreaga, APRN  03/10/2023

## 2023-03-10 ENCOUNTER — OFFICE VISIT (OUTPATIENT)
Dept: OBSTETRICS AND GYNECOLOGY | Facility: CLINIC | Age: 28
End: 2023-03-10
Payer: COMMERCIAL

## 2023-03-10 VITALS
WEIGHT: 180.4 LBS | DIASTOLIC BLOOD PRESSURE: 70 MMHG | BODY MASS INDEX: 28.99 KG/M2 | HEIGHT: 66 IN | SYSTOLIC BLOOD PRESSURE: 100 MMHG

## 2023-03-10 DIAGNOSIS — Z30.09 ENCOUNTER FOR OTHER GENERAL COUNSELING OR ADVICE ON CONTRACEPTION: Primary | ICD-10-CM

## 2023-03-10 DIAGNOSIS — R10.2 PELVIC PAIN: ICD-10-CM

## 2023-03-10 PROCEDURE — 99212 OFFICE O/P EST SF 10 MIN: CPT | Performed by: NURSE PRACTITIONER

## 2023-03-21 DIAGNOSIS — F41.9 ANXIETY: ICD-10-CM

## 2023-03-21 DIAGNOSIS — F32.A DEPRESSION, UNSPECIFIED DEPRESSION TYPE: Primary | ICD-10-CM

## 2023-03-28 NOTE — PROGRESS NOTES
Gynecologic Procedure Note        Procedure: IUD Insertion     Procedures    Pre procedure indication 1) Desires Kyleena  Post procedure indication 1) Desires Kyleena    NDC: Kyleena  [id number]  Lot #: [id number]  Exp Date: 01/2025      The risks, benefits, and alternatives to Kyleena were explained at length with the patient. All her questions were answered and consents were signed.  Her LMP was 03/29/2023.  Urine Pregnancy Test was Negative.  Patient does not have an allergy to betadine or shellfish.    The patient was placed in a dorsal lithotomy position on the examining table in [facility]. A bimanual exam confirmed the uterus was anteverted. A speculum was inserted into the vagina and the cervix was brought into view.  The cervix was prepped with Betadine. The anterior lip of the cervix was then grasped with a single-tooth tenaculum. The endometrial cavity was then sounded to 7 centimeters.     The pt then said she felt like she was going to vomit and she lost consciousness for a few seconds.  Tenaculum and speculum were removed.  Pt was flat on exam table with cool wash cloth, water for approx 15 minutes.      She got dressed and ambulated out of office without difficulty.      She would like to attempt insertion again but requests sedation or medication.    Erx Ativan 1 mg #1 done.  Eat a full meal.  Take ibuprofen 600 mg one hour before procedure.    Her  was present beside her the entire visit.    Rasheeda Pineda, APRN  03/29/2023

## 2023-03-29 ENCOUNTER — OFFICE VISIT (OUTPATIENT)
Dept: OBSTETRICS AND GYNECOLOGY | Facility: CLINIC | Age: 28
End: 2023-03-29
Payer: COMMERCIAL

## 2023-03-29 VITALS
BODY MASS INDEX: 28.48 KG/M2 | SYSTOLIC BLOOD PRESSURE: 90 MMHG | WEIGHT: 177.2 LBS | HEIGHT: 66 IN | DIASTOLIC BLOOD PRESSURE: 60 MMHG

## 2023-03-29 DIAGNOSIS — Z30.430 ENCOUNTER FOR IUD INSERTION: Primary | ICD-10-CM

## 2023-03-29 DIAGNOSIS — Z53.8 UNSUCCESSFUL IUD INSERTION: ICD-10-CM

## 2023-03-29 LAB
B-HCG UR QL: NEGATIVE
EXPIRATION DATE: NORMAL
INTERNAL NEGATIVE CONTROL: NEGATIVE
INTERNAL POSITIVE CONTROL: POSITIVE
Lab: NORMAL

## 2023-03-29 RX ORDER — LORAZEPAM 1 MG/1
TABLET ORAL
Qty: 1 TABLET | Refills: 0 | Status: SHIPPED | OUTPATIENT
Start: 2023-03-29

## 2023-03-30 ENCOUNTER — OFFICE VISIT (OUTPATIENT)
Dept: OBSTETRICS AND GYNECOLOGY | Facility: CLINIC | Age: 28
End: 2023-03-30
Payer: COMMERCIAL

## 2023-03-30 VITALS
SYSTOLIC BLOOD PRESSURE: 110 MMHG | BODY MASS INDEX: 28.45 KG/M2 | HEIGHT: 66 IN | DIASTOLIC BLOOD PRESSURE: 78 MMHG | WEIGHT: 177 LBS

## 2023-03-30 DIAGNOSIS — Z30.09 COUNSELING FOR BIRTH CONTROL REGARDING INTRAUTERINE DEVICE (IUD): ICD-10-CM

## 2023-03-30 DIAGNOSIS — Z12.4 SCREENING FOR CERVICAL CANCER: ICD-10-CM

## 2023-03-30 DIAGNOSIS — Z30.430 ENCOUNTER FOR INSERTION OF INTRAUTERINE CONTRACEPTIVE DEVICE (IUD): Primary | ICD-10-CM

## 2023-03-30 NOTE — PROGRESS NOTES
Gynecologic Procedure Note        Procedure: IUD Insertion     Procedures    Pre procedure indication 1) Desires Kyleena  Post procedure indication 1) Desires Kyleena    NDC: Kyleena  [id number]  Lot #: [id number]  Exp Date: 01/2025  BH device    The risks, benefits, and alternatives to Kyleena were explained at length with the patient. All her questions were answered and consents were signed.  Her LMP was 03/29/2023 .  Urine Pregnancy Test was Negative.  Patient does not have an allergy to betadine or shellfish.    The patient was placed in a dorsal lithotomy position on the examining table in [facility]. A bimanual exam confirmed the uterus was anteverted. A speculum was inserted into the vagina and the cervix was brought into view.  The cervix was prepped with Betadine. The anterior lip of the cervix was then grasped with a single-tooth tenaculum. The endometrial cavity was then sounded to 7 centimeters. The sealed Kyleena package was opened and the IUD was removed in a sterile fashion.    The upper edge of the depth setting the flange was set at the uterine sound measurement. The  was then carefully advanced to the cervical canal into the uterus to the level of the fundus.  The slider was then retracted about 1 cm and deployed the device. The device was then gently advanced to the fundus. The IUD was then released by pulling the slider down all the way. The  was removed carefully from the uterus. The threads were then cut leaving 2-3 cm visible outside of the cervix.  The single-tooth tenaculum was removed from the anterior lip. Good hemostasis was noted.   All other instruments were removed from the vagina.       The patient tolerated the procedure adequately with a moderate amount of discomfort. Pt felt light headed after procedure She was monitored for 15 minutes prior to discharge.      There were no complications. An u/s was performed which confirmed correct fundal location of IUD>      The patient was counseled about the need to return in 4 weeks for IUD check.     She was counseled about the need to use a backup method of contraception such as condoms until her post insertion exam was performed. The patient verbalized understanding when the IUD will need to be removed/replaced. Written information was given to the patient.  The patient is counseled to contact us if she has any significant or increasing bleeding, pain, fever, chills, or other concerns. She is instructed to see a doctor right away if she believes that she may be pregnant at any time with the IUD in place.    Malvin Lemus MD  03/30/2023